# Patient Record
Sex: MALE | Race: WHITE | NOT HISPANIC OR LATINO | Employment: FULL TIME | URBAN - NONMETROPOLITAN AREA
[De-identification: names, ages, dates, MRNs, and addresses within clinical notes are randomized per-mention and may not be internally consistent; named-entity substitution may affect disease eponyms.]

---

## 2021-05-10 ENCOUNTER — HOSPITAL ENCOUNTER (EMERGENCY)
Facility: HOSPITAL | Age: 56
Discharge: HOME | End: 2021-05-10
Attending: EMERGENCY MEDICINE | Admitting: EMERGENCY MEDICINE
Payer: COMMERCIAL

## 2021-05-10 ENCOUNTER — APPOINTMENT (EMERGENCY)
Dept: CT IMAGING | Facility: HOSPITAL | Age: 56
End: 2021-05-10
Payer: COMMERCIAL

## 2021-05-10 ENCOUNTER — TELEPHONE (OUTPATIENT)
Dept: OTHER | Facility: HOSPITAL | Age: 56
End: 2021-05-10

## 2021-05-10 ENCOUNTER — APPOINTMENT (EMERGENCY)
Dept: RADIOLOGY | Facility: HOSPITAL | Age: 56
End: 2021-05-10
Payer: COMMERCIAL

## 2021-05-10 VITALS
SYSTOLIC BLOOD PRESSURE: 153 MMHG | WEIGHT: 199.52 LBS | DIASTOLIC BLOOD PRESSURE: 93 MMHG | OXYGEN SATURATION: 99 % | HEIGHT: 69 IN | TEMPERATURE: 97 F | RESPIRATION RATE: 16 BRPM | BODY MASS INDEX: 29.55 KG/M2 | HEART RATE: 71 BPM

## 2021-05-10 DIAGNOSIS — U07.1 PNEUMONIA DUE TO COVID-19 VIRUS: Primary | ICD-10-CM

## 2021-05-10 DIAGNOSIS — J12.82 PNEUMONIA DUE TO COVID-19 VIRUS: Primary | ICD-10-CM

## 2021-05-10 DIAGNOSIS — N28.89 LEFT RENAL MASS: ICD-10-CM

## 2021-05-10 LAB
ALBUMIN SERPL BCP-MCNC: 3.8 G/DL (ref 3.5–5)
ALP SERPL-CCNC: 50 U/L (ref 46–116)
ALT SERPL W P-5'-P-CCNC: 36 U/L (ref 12–78)
ANION GAP SERPL CALCULATED.3IONS-SCNC: 3 MMOL/L (ref 4–13)
APTT PPP: 28 SECONDS (ref 23–37)
AST SERPL W P-5'-P-CCNC: 27 U/L (ref 5–45)
BACTERIA UR QL AUTO: NORMAL /HPF
BASOPHILS # BLD AUTO: 0.01 THOUSANDS/ΜL (ref 0–0.1)
BASOPHILS NFR BLD AUTO: 0 % (ref 0–1)
BILIRUB SERPL-MCNC: 0.48 MG/DL (ref 0.2–1)
BILIRUB UR QL STRIP: NEGATIVE
BUN SERPL-MCNC: 13 MG/DL (ref 5–25)
CALCIUM SERPL-MCNC: 8.5 MG/DL (ref 8.3–10.1)
CHLORIDE SERPL-SCNC: 104 MMOL/L (ref 100–108)
CLARITY UR: CLEAR
CO2 SERPL-SCNC: 33 MMOL/L (ref 21–32)
COLOR UR: YELLOW
CREAT SERPL-MCNC: 1.18 MG/DL (ref 0.6–1.3)
D DIMER PPP FEU-MCNC: 0.35 UG/ML FEU
EOSINOPHIL # BLD AUTO: 0 THOUSAND/ΜL (ref 0–0.61)
EOSINOPHIL NFR BLD AUTO: 0 % (ref 0–6)
ERYTHROCYTE [DISTWIDTH] IN BLOOD BY AUTOMATED COUNT: 12.5 % (ref 11.6–15.1)
GFR SERPL CREATININE-BSD FRML MDRD: 70 ML/MIN/1.73SQ M
GLUCOSE SERPL-MCNC: 137 MG/DL (ref 65–140)
GLUCOSE UR STRIP-MCNC: NEGATIVE MG/DL
HCT VFR BLD AUTO: 48.2 % (ref 36.5–49.3)
HGB BLD-MCNC: 15.7 G/DL (ref 12–17)
HGB UR QL STRIP.AUTO: ABNORMAL
IMM GRANULOCYTES # BLD AUTO: 0.02 THOUSAND/UL (ref 0–0.2)
IMM GRANULOCYTES NFR BLD AUTO: 1 % (ref 0–2)
INR PPP: 0.93 (ref 0.84–1.19)
KETONES UR STRIP-MCNC: NEGATIVE MG/DL
LACTATE SERPL-SCNC: 1.9 MMOL/L (ref 0.5–2)
LEUKOCYTE ESTERASE UR QL STRIP: NEGATIVE
LIPASE SERPL-CCNC: 191 U/L (ref 73–393)
LYMPHOCYTES # BLD AUTO: 0.64 THOUSANDS/ΜL (ref 0.6–4.47)
LYMPHOCYTES NFR BLD AUTO: 15 % (ref 14–44)
MCH RBC QN AUTO: 28.5 PG (ref 26.8–34.3)
MCHC RBC AUTO-ENTMCNC: 32.6 G/DL (ref 31.4–37.4)
MCV RBC AUTO: 88 FL (ref 82–98)
MONOCYTES # BLD AUTO: 0.37 THOUSAND/ΜL (ref 0.17–1.22)
MONOCYTES NFR BLD AUTO: 9 % (ref 4–12)
NEUTROPHILS # BLD AUTO: 3.32 THOUSANDS/ΜL (ref 1.85–7.62)
NEUTS SEG NFR BLD AUTO: 75 % (ref 43–75)
NITRITE UR QL STRIP: NEGATIVE
NON-SQ EPI CELLS URNS QL MICRO: NORMAL /HPF
NRBC BLD AUTO-RTO: 0 /100 WBCS
PH UR STRIP.AUTO: 5.5 [PH]
PLATELET # BLD AUTO: 136 THOUSANDS/UL (ref 149–390)
PMV BLD AUTO: 10.4 FL (ref 8.9–12.7)
POTASSIUM SERPL-SCNC: 4.5 MMOL/L (ref 3.5–5.3)
PROT SERPL-MCNC: 7.7 G/DL (ref 6.4–8.2)
PROT UR STRIP-MCNC: NEGATIVE MG/DL
PROTHROMBIN TIME: 12.3 SECONDS (ref 11.6–14.5)
RBC # BLD AUTO: 5.51 MILLION/UL (ref 3.88–5.62)
RBC #/AREA URNS AUTO: NORMAL /HPF
SARS-COV-2 RNA RESP QL NAA+PROBE: POSITIVE
SODIUM SERPL-SCNC: 140 MMOL/L (ref 136–145)
SP GR UR STRIP.AUTO: 1.01 (ref 1–1.03)
TROPONIN I SERPL-MCNC: <0.02 NG/ML
UROBILINOGEN UR QL STRIP.AUTO: 0.2 E.U./DL
WBC # BLD AUTO: 4.36 THOUSAND/UL (ref 4.31–10.16)
WBC #/AREA URNS AUTO: NORMAL /HPF

## 2021-05-10 PROCEDURE — 99285 EMERGENCY DEPT VISIT HI MDM: CPT

## 2021-05-10 PROCEDURE — 85379 FIBRIN DEGRADATION QUANT: CPT | Performed by: EMERGENCY MEDICINE

## 2021-05-10 PROCEDURE — U0003 INFECTIOUS AGENT DETECTION BY NUCLEIC ACID (DNA OR RNA); SEVERE ACUTE RESPIRATORY SYNDROME CORONAVIRUS 2 (SARS-COV-2) (CORONAVIRUS DISEASE [COVID-19]), AMPLIFIED PROBE TECHNIQUE, MAKING USE OF HIGH THROUGHPUT TECHNOLOGIES AS DESCRIBED BY CMS-2020-01-R: HCPCS | Performed by: EMERGENCY MEDICINE

## 2021-05-10 PROCEDURE — 84484 ASSAY OF TROPONIN QUANT: CPT | Performed by: EMERGENCY MEDICINE

## 2021-05-10 PROCEDURE — 85025 COMPLETE CBC W/AUTO DIFF WBC: CPT | Performed by: EMERGENCY MEDICINE

## 2021-05-10 PROCEDURE — 83605 ASSAY OF LACTIC ACID: CPT | Performed by: EMERGENCY MEDICINE

## 2021-05-10 PROCEDURE — 83690 ASSAY OF LIPASE: CPT | Performed by: EMERGENCY MEDICINE

## 2021-05-10 PROCEDURE — 85730 THROMBOPLASTIN TIME PARTIAL: CPT | Performed by: EMERGENCY MEDICINE

## 2021-05-10 PROCEDURE — 80053 COMPREHEN METABOLIC PANEL: CPT | Performed by: EMERGENCY MEDICINE

## 2021-05-10 PROCEDURE — 81001 URINALYSIS AUTO W/SCOPE: CPT | Performed by: EMERGENCY MEDICINE

## 2021-05-10 PROCEDURE — 93005 ELECTROCARDIOGRAM TRACING: CPT

## 2021-05-10 PROCEDURE — 96361 HYDRATE IV INFUSION ADD-ON: CPT

## 2021-05-10 PROCEDURE — U0005 INFEC AGEN DETEC AMPLI PROBE: HCPCS | Performed by: EMERGENCY MEDICINE

## 2021-05-10 PROCEDURE — 85610 PROTHROMBIN TIME: CPT | Performed by: EMERGENCY MEDICINE

## 2021-05-10 PROCEDURE — 74177 CT ABD & PELVIS W/CONTRAST: CPT

## 2021-05-10 PROCEDURE — 99285 EMERGENCY DEPT VISIT HI MDM: CPT | Performed by: EMERGENCY MEDICINE

## 2021-05-10 PROCEDURE — G1004 CDSM NDSC: HCPCS

## 2021-05-10 PROCEDURE — 71045 X-RAY EXAM CHEST 1 VIEW: CPT

## 2021-05-10 PROCEDURE — 36415 COLL VENOUS BLD VENIPUNCTURE: CPT | Performed by: EMERGENCY MEDICINE

## 2021-05-10 PROCEDURE — 96374 THER/PROPH/DIAG INJ IV PUSH: CPT

## 2021-05-10 RX ORDER — DOXYCYCLINE HYCLATE 100 MG/1
100 CAPSULE ORAL 2 TIMES DAILY
Qty: 20 CAPSULE | Refills: 0 | Status: SHIPPED | OUTPATIENT
Start: 2021-05-10 | End: 2021-05-20

## 2021-05-10 RX ORDER — DOXYCYCLINE HYCLATE 100 MG/1
100 CAPSULE ORAL ONCE
Status: COMPLETED | OUTPATIENT
Start: 2021-05-10 | End: 2021-05-10

## 2021-05-10 RX ORDER — KETOROLAC TROMETHAMINE 30 MG/ML
15 INJECTION, SOLUTION INTRAMUSCULAR; INTRAVENOUS ONCE
Status: COMPLETED | OUTPATIENT
Start: 2021-05-10 | End: 2021-05-10

## 2021-05-10 RX ADMIN — SODIUM CHLORIDE 1000 ML: 0.9 INJECTION, SOLUTION INTRAVENOUS at 09:04

## 2021-05-10 RX ADMIN — IOHEXOL 100 ML: 350 INJECTION, SOLUTION INTRAVENOUS at 09:30

## 2021-05-10 RX ADMIN — KETOROLAC TROMETHAMINE 15 MG: 30 INJECTION, SOLUTION INTRAMUSCULAR at 09:04

## 2021-05-10 RX ADMIN — DOXYCYCLINE 100 MG: 100 CAPSULE ORAL at 10:42

## 2021-05-10 NOTE — TELEPHONE ENCOUNTER
Hospital followup [facility] new left 3-4cm renal mass  Please arrange office visit with robotic MD only, Star

## 2021-05-10 NOTE — DISCHARGE INSTRUCTIONS
Your CAT scan shows that you have a mass on her left liver  The urology team will contact you to set up an appointment  If you do not hear from within the next few days, please call to set this up  [address]    Your healthcare provider and/or public health staff have evaluated you and have determined that you do not need to remain in the hospital at this time  At this time you can be isolated at home where you will be monitored by staff from your local or state health department  You should carefully follow the prevention and isolation steps below until a healthcare provider or local or state health department says that you can return to your normal activities  Stay home except to get medical care    People who are mildly ill with COVID-19 are able to isolate at home during their illness  You should restrict activities outside your home, except for getting medical care  Do not go to work, school, or public areas  Avoid using public transportation, ride-sharing, or taxis  Separate yourself from other people and animals in your home    People: As much as possible, you should stay in a specific room and away from other people in your home  Also, you should use a separate bathroom, if available  Animals: You should restrict contact with pets and other animals while you are sick with COVID-19, just like you would around other people  Although there have not been reports of pets or other animals becoming sick with COVID-19, it is still recommended that people sick with COVID-19 limit contact with animals until more information is known about the virus  When possible, have another member of your household care for your animals while you are sick  If you are sick with COVID-19, avoid contact with your pet, including petting, snuggling, being kissed or licked, and sharing food   If you must care for your pet or be around animals while you are sick, wash your hands before and after you interact with pets and wear a facemask  See COVID-19 and Animals for more information  Call ahead before visiting your doctor    If you have a medical appointment, call the healthcare provider and tell them that you have or may have COVID-19  This will help the healthcare providers office take steps to keep other people from getting infected or exposed  Wear a facemask    You should wear a facemask when you are around other people (e g , sharing a room or vehicle) or pets and before you enter a healthcare providers office  If you are not able to wear a facemask (for example, because it causes trouble breathing), then people who live with you should not stay in the same room with you, or they should wear a facemask if they enter your room  Cover your coughs and sneezes    Cover your mouth and nose with a tissue when you cough or sneeze  Throw used tissues in a lined trash can  Immediately wash your hands with soap and water for at least 20 seconds or, if soap and water are not available, clean your hands with an alcohol-based hand  that contains at least 60% alcohol  Clean your hands often    Wash your hands often with soap and water for at least 20 seconds, especially after blowing your nose, coughing, or sneezing; going to the bathroom; and before eating or preparing food  If soap and water are not readily available, use an alcohol-based hand  with at least 60% alcohol, covering all surfaces of your hands and rubbing them together until they feel dry  Soap and water are the best option if hands are visibly dirty  Avoid touching your eyes, nose, and mouth with unwashed hands  Avoid sharing personal household items    You should not share dishes, drinking glasses, cups, eating utensils, towels, or bedding with other people or pets in your home  After using these items, they should be washed thoroughly with soap and water      Clean all high-touch surfaces everyday    High touch surfaces include counters, tabletops, doorknobs, bathroom fixtures, toilets, phones, keyboards, tablets, and bedside tables  Also, clean any surfaces that may have blood, stool, or body fluids on them  Use a household cleaning spray or wipe, according to the label instructions  Labels contain instructions for safe and effective use of the cleaning product including precautions you should take when applying the product, such as wearing gloves and making sure you have good ventilation during use of the product  Monitor your symptoms    Seek prompt medical attention if your illness is worsening (e g , difficulty breathing)  Before seeking care, call your healthcare provider and tell them that you have, or are being evaluated for, COVID-19  Put on a facemask before you enter the facility  These steps will help the healthcare providers office to keep other people in the office or waiting room from getting infected or exposed  Ask your healthcare provider to call the local or [facility] health department  Persons who are placed under active monitoring or facilitated self-monitoring should follow instructions provided by their local health department or occupational health professionals, as appropriate  If you have a medical emergency and need to call 911, notify the dispatch personnel that you have, or are being evaluated for COVID-19  If possible, put on a facemask before emergency medical services arrive  Discontinuing home isolation    Patients with confirmed COVID-19 should remain under home isolation precautions until the following conditions are met:    They have had no fever for at least 24 hours (that is one full day of no fever without the use medicine that reduces fevers)  AND  other symptoms have improved (for example, when their cough or shortness of breath have improved)  AND  If had mild or moderate illness, at least 10 days have passed since their symptoms first appeared or if severe illness (needed oxygen) or immunosuppressed, at least 20 days have passed since symptoms first appeared  Patients with confirmed COVID-19 should also notify close contacts (including their workplace) and ask that they self-quarantine  Currently, close contact is defined as being within 6 feet for 15 minutes or more from the period 24 hours starting 48 hours before symptom onset to the time at which the patient went into isolation  Close contacts of patients diagnosed with COVID-19 should be instructed by the patient to self-quarantine for 14 days from the last time of their last contact with the patient       Source: [employer]

## 2021-05-10 NOTE — ED PROVIDER NOTES
History  Chief Complaint   Patient presents with    Abdominal Pain     pt c/o left upper abdominal pain radiating to back and sore throat for past couple days  pt exposed to live-in family member tested covid positive  hx constipation  denies travel/sob/fevers/cough     Patient complains of left upper quadrant pain on off for the past few days  Occasional radiation to his back  No change with breathing  No trauma  No recent cough or cold symptoms  No dysuria  No nausea vomiting or diarrhea  Nothing taken for symptoms  Nothing seems to make it worse  No history of similar episodes  History provided by:  Patient   used: No    Abdominal Pain  Pain location:  LUQ  Pain quality: aching    Pain radiates to:  L flank  Pain severity:  Mild  Onset quality:  Gradual  Duration:  3 days  Timing:  Intermittent  Progression:  Waxing and waning  Chronicity:  New  Context: not recent illness and not sick contacts    Relieved by:  Nothing  Worsened by:  Nothing  Ineffective treatments:  None tried  Associated symptoms: constipation    Associated symptoms: no anorexia, no chest pain, no chills, no cough, no diarrhea, no dysuria, no fatigue, no fever, no hematuria, no nausea, no shortness of breath, no sore throat and no vomiting        None       History reviewed  No pertinent past medical history  History reviewed  No pertinent surgical history  History reviewed  No pertinent family history  I have reviewed and agree with the history as documented  E-Cigarette/Vaping    E-Cigarette Use Never User      E-Cigarette/Vaping Substances     Social History     Tobacco Use    Smoking status: Never Smoker    Smokeless tobacco: Never Used   Substance Use Topics    Alcohol use: Not Currently    Drug use: Never       Review of Systems   Constitutional: Negative for chills, fatigue and fever  HENT: Negative for ear pain, hearing loss, sore throat, trouble swallowing and voice change      Eyes: Negative for pain and discharge  Respiratory: Negative for cough, shortness of breath and wheezing  Cardiovascular: Negative for chest pain and palpitations  Gastrointestinal: Positive for abdominal pain and constipation  Negative for anorexia, blood in stool, diarrhea, nausea and vomiting  Genitourinary: Negative for dysuria, flank pain, frequency and hematuria  Musculoskeletal: Negative for joint swelling, neck pain and neck stiffness  Skin: Negative for rash and wound  Neurological: Negative for dizziness, seizures, syncope, facial asymmetry and headaches  Psychiatric/Behavioral: Negative for hallucinations, self-injury and suicidal ideas  All other systems reviewed and are negative  Physical Exam  Physical Exam  Vitals signs and nursing note reviewed  Constitutional:       General: He is not in acute distress  Appearance: He is well-developed  HENT:      Head: Normocephalic and atraumatic  Right Ear: External ear normal       Left Ear: External ear normal    Eyes:      Conjunctiva/sclera: Conjunctivae normal       Pupils: Pupils are equal, round, and reactive to light  Neck:      Musculoskeletal: Normal range of motion and neck supple  Cardiovascular:      Rate and Rhythm: Normal rate and regular rhythm  Heart sounds: Normal heart sounds  No murmur  Pulmonary:      Effort: Pulmonary effort is normal       Breath sounds: Normal breath sounds  No wheezing or rales  Abdominal:      General: Bowel sounds are normal  There is no distension  Palpations: Abdomen is soft  Tenderness: There is abdominal tenderness in the left lower quadrant  There is no guarding or rebound  Musculoskeletal: Normal range of motion  General: No deformity  Skin:     General: Skin is warm and dry  Findings: No rash  Neurological:      General: No focal deficit present  Mental Status: He is alert and oriented to person, place, and time  Cranial Nerves:  No cranial nerve deficit     Psychiatric:         Behavior: Behavior normal          Vital Signs  ED Triage Vitals [05/10/21 0849]   Temperature Pulse Respirations Blood Pressure SpO2   (!) 97 °F (36 1 °C) 89 17 166/89 97 %      Temp Source Heart Rate Source Patient Position - Orthostatic VS BP Location FiO2 (%)   Temporal Monitor Lying Right arm --      Pain Score       3           Vitals:    05/10/21 0849 05/10/21 1015 05/10/21 1030   BP: 166/89  153/93   Pulse: 89 72 71   Patient Position - Orthostatic VS: Lying           Visual Acuity      ED Medications  Medications   sodium chloride 0 9 % bolus 1,000 mL (0 mL Intravenous Stopped 5/10/21 1039)   ketorolac (TORADOL) injection 15 mg (15 mg Intravenous Given 5/10/21 0904)   iohexol (OMNIPAQUE) 350 MG/ML injection (SINGLE-DOSE) 100 mL (100 mL Intravenous Given 5/10/21 0930)   doxycycline hyclate (VIBRAMYCIN) capsule 100 mg (100 mg Oral Given 5/10/21 1042)       Diagnostic Studies  Results Reviewed     Procedure Component Value Units Date/Time    Urine Microscopic [[id number]]  (Normal) Collected: 05/10/21 1028    Lab Status: Final result Specimen: Urine, Clean Catch Updated: 05/10/21 1054     RBC, UA 1-2 /hpf      WBC, UA None Seen /hpf      Epithelial Cells Occasional /hpf      Bacteria, UA None Seen /hpf     UA w Reflex to Microscopic w Reflex to Culture [[id number]]  (Abnormal) Collected: 05/10/21 1028    Lab Status: Final result Specimen: Urine, Clean Catch Updated: 05/10/21 1035     Color, UA Yellow     Clarity, UA Clear     Specific Gravity, UA 1 010     pH, UA 5 5     Leukocytes, UA Negative     Nitrite, UA Negative     Protein, UA Negative mg/dl      Glucose, UA Negative mg/dl      Ketones, UA Negative mg/dl      Urobilinogen, UA 0 2 E U /dl      Bilirubin, UA Negative     Blood, UA Trace-lysed    Novel Coronavirus (Covid-19),PCR [facility] - 2 Hour Stat [[id number]]  (Abnormal) Collected: 05/10/21 0910    Lab Status: Final result Specimen: Nares from Nasopharyngeal Swab Updated: 05/10/21 0957     SARS-CoV-2 Positive    Narrative: The specimen collection materials, transport medium, and/or testing methodology utilized in the production of these test results have been proven to be reliable in a limited validation with an abbreviated program under the Emergency Utilization Authorization provided by the FDA  Testing reported as "Presumptive positive" will be confirmed with secondary testing to ensure result accuracy  Clinical caution and judgement should be used with the interpretation of these results with consideration of the clinical impression and other laboratory testing  Testing reported as "Positive" or "Negative" has been proven to be accurate according to standard laboratory validation requirements  All testing is performed with control materials showing appropriate reactivity at standard intervals  Lactic acid [[id number]]  (Normal) Collected: 05/10/21 0859    Lab Status: Final result Specimen: Blood from Arm, Left Updated: 05/10/21 0925     LACTIC ACID 1 9 mmol/L     Narrative:      Result may be elevated if tourniquet was used during collection      Troponin I [[id number]]  (Normal) Collected: 05/10/21 0859    Lab Status: Final result Specimen: Blood from Arm, Left Updated: 05/10/21 0925     Troponin I <0 02 ng/mL     D-Dimer [[id number]]  (Normal) Collected: 05/10/21 0859    Lab Status: Final result Specimen: Blood from Arm, Left Updated: 05/10/21 0920     D-Dimer, Quant 0 35 ug/ml FEU     Comprehensive metabolic panel [[id number]]  (Abnormal) Collected: 05/10/21 0859    Lab Status: Final result Specimen: Blood from Arm, Left Updated: 05/10/21 0920     Sodium 140 mmol/L      Potassium 4 5 mmol/L      Chloride 104 mmol/L      CO2 33 mmol/L      ANION GAP 3 mmol/L      BUN 13 mg/dL      Creatinine 1 18 mg/dL      Glucose 137 mg/dL      Calcium 8 5 mg/dL      AST 27 U/L      ALT 36 U/L      Alkaline Phosphatase 50 U/L      Total Protein 7 7 g/dL      Albumin 3 8 g/dL      Total Bilirubin 0 48 mg/dL      eGFR 70 ml/min/1 73sq m     Narrative:      National Kidney Disease Foundation guidelines for Chronic Kidney Disease (CKD):     Stage 1 with normal or high GFR (GFR > 90 mL/min/1 73 square meters)    Stage 2 Mild CKD (GFR = 60-89 mL/min/1 73 square meters)    Stage 3A Moderate CKD (GFR = 45-59 mL/min/1 73 square meters)    Stage 3B Moderate CKD (GFR = 30-44 mL/min/1 73 square meters)    Stage 4 Severe CKD (GFR = 15-29 mL/min/1 73 square meters)    Stage 5 End Stage CKD (GFR <15 mL/min/1 73 square meters)  Note: GFR calculation is accurate only with a steady state creatinine    Lipase [[id number]]  (Normal) Collected: 05/10/21 0859    Lab Status: Final result Specimen: Blood from Arm, Left Updated: 05/10/21 0920     Lipase 191 u/L     Protime-INR [[id number]]  (Normal) Collected: 05/10/21 0859    Lab Status: Final result Specimen: Blood from Arm, Left Updated: 05/10/21 0918     Protime 12 3 seconds      INR 0 93    APTT [[id number]]  (Normal) Collected: 05/10/21 0859    Lab Status: Final result Specimen: Blood from Arm, Left Updated: 05/10/21 0918     PTT 28 seconds     CBC and differential [[id number]]  (Abnormal) Collected: 05/10/21 0859    Lab Status: Final result Specimen: Blood from Arm, Left Updated: 05/10/21 0914     WBC 4 36 Thousand/uL      RBC 5 51 Million/uL      Hemoglobin 15 7 g/dL      Hematocrit 48 2 %      MCV 88 fL      MCH 28 5 pg      MCHC 32 6 g/dL      RDW 12 5 %      MPV 10 4 fL      Platelets 221 Thousands/uL      nRBC 0 /100 WBCs      Neutrophils Relative 75 %      Immat GRANS % 1 %      Lymphocytes Relative 15 %      Monocytes Relative 9 %      Eosinophils Relative 0 %      Basophils Relative 0 %      Neutrophils Absolute 3 32 Thousands/µL      Immature Grans Absolute 0 02 Thousand/uL      Lymphocytes Absolute 0 64 Thousands/µL      Monocytes Absolute 0 37 Thousand/µL      Eosinophils Absolute 0 00 Thousand/µL      Basophils Absolute 0 01 Thousands/µL                  CT abdomen pelvis with contrast   Final Result by Gabby Peoples MD (05/10 1001)      No acute inflammatory stranding   Diverticulosis   3 6 x 3 7 x 2 4 cm solid renal mass upper pole left kidney, likely neoplasm   No retroperitoneal lymph node enlargement seen   No renal vein invasion   Urology evaluation suggested   Nodular areas of groundglass density seen at the left lung base, right lung base typical for Covid associated infiltrate  Correlate clinically to exclude other etiologies      Follow-up chest CT at 3 months to demonstrate resolution          I personally discussed this study with JH Harris on 5/10/2021 at 9:57 AM                       Workstation performed: [id number]         XR chest 1 view portable   Final Result by Oscar Simental MD (05/10 9971)      No acute cardiopulmonary disease  Workstation performed: [id number]                    Procedures  ECG 12 Lead Documentation Only    Date/Time: 5/10/2021 8:51 AM  Performed by: Marina Sloan MD  Authorized by: Marina Sloan MD     ECG reviewed by me, the ED Provider: yes    Patient location:  ED  Previous ECG:     Previous ECG:  Unavailable  Rate:     ECG rate:  70  Rhythm:     Rhythm: sinus rhythm    QRS:     QRS axis:  Normal             ED Course  ED Course as of May 10 1205   Mon May 10, 2021   1025 Discussed with Urology PA, Liz Ashby  Will set up outpatient follow-up with the robotic urologist, Dr Cheryle Avery   I discussed with the patient who understands and is agreement with the plan of care  1204 Patient called  Patient wanted clarification on his discharge instructions  His discharge directions say he had a mass on his left liver    Patient was reassured that it is his left kidney and that he was referred to the correct specialist                                 SBIRT 20yo+      Most Recent Value   SBIRT (24 yo +)   In order to provide better care to our patients, we are screening all of our patients for alcohol and drug use  Would it be okay to ask you these screening questions? Yes Filed at: 05/10/2021 7843   Initial Alcohol Screen: US AUDIT-C    1  How often do you have a drink containing alcohol?  0 Filed at: 05/10/2021 0911   2  How many drinks containing alcohol do you have on a typical day you are drinking? 0 Filed at: 05/10/2021 0911   3a  Male UNDER 65: How often do you have five or more drinks on one occasion? 0 Filed at: 05/10/2021 0911   Audit-C Score  0 Filed at: 05/10/2021 1740   TIFFANY: How many times in the past year have you    Used an illegal drug or used a prescription medication for non-medical reasons? Never Filed at: 05/10/2021 0911                    [facility]  Number of Diagnoses or Management Options     Amount and/or Complexity of Data Reviewed  Clinical lab tests: reviewed  Review and summarize past medical records: yes        Disposition  Final diagnoses:   Pneumonia due to COVID-19 virus   Left renal mass     Time reflects when diagnosis was documented in both MDM as applicable and the Disposition within this note     Time User Action Codes Description Comment    5/10/2021 10:02 AM Chito Muniz Add [U07 1,  J12 82] Pneumonia due to COVID-19 virus     5/10/2021 10:05 AM Sonny Lombard Add [N28 89] Left renal mass       ED Disposition     ED Disposition Condition Date/Time Comment    Discharge Stable Mon May 10, 2021 10:27 AM Larry Aguayo  discharge to home/self care              Follow-up Information     Follow up With Specialties Details Why Contact Info Additional [address] For Urology [facility] Urology Schedule an appointment as soon as possible for a visit in 3 week(s) urology office will call you to arrange visit to discuss CT findings of a mass on your kidney [address]  [place] [facility] [address]  701  [facility] Urology [facility], [address], [place], [place], [address]    [phone]    Scotty Garcia MD Urology Schedule an appointment as soon as possible for a visit in 21 days They should be calling you to schedule this appointment  [address] [facility]  [address] [place] [address]             Discharge Medication List as of 5/10/2021 10:29 AM      START taking these medications    Details   doxycycline hyclate (VIBRAMYCIN) 100 mg capsule Take 1 capsule (100 mg total) by mouth 2 (two) times a day for 10 days, Starting Mon 5/10/2021, Until Thu 5/20/2021, Normal           No discharge procedures on file      PDMP Review     None          ED Provider  Electronically Signed by           Alida Favre, MD  05/10/21 [place] Lay Campos MD  05/10/21 0535

## 2021-05-15 LAB
ATRIAL RATE: 73 BPM
P AXIS: 58 DEGREES
PR INTERVAL: 166 MS
QRS AXIS: 74 DEGREES
QRSD INTERVAL: 96 MS
QT INTERVAL: 396 MS
QTC INTERVAL: 436 MS
T WAVE AXIS: 69 DEGREES
VENTRICULAR RATE: 73 BPM

## 2021-05-15 PROCEDURE — 93010 ELECTROCARDIOGRAM REPORT: CPT | Performed by: INTERNAL MEDICINE

## 2021-06-01 ENCOUNTER — OFFICE VISIT (OUTPATIENT)
Dept: UROLOGY | Facility: CLINIC | Age: 56
End: 2021-06-01
Payer: COMMERCIAL

## 2021-06-01 VITALS
HEIGHT: 69 IN | BODY MASS INDEX: 30.51 KG/M2 | SYSTOLIC BLOOD PRESSURE: 124 MMHG | WEIGHT: 206 LBS | DIASTOLIC BLOOD PRESSURE: 72 MMHG

## 2021-06-01 DIAGNOSIS — M89.9 LESION OF BONE OF THORACIC SPINE: ICD-10-CM

## 2021-06-01 DIAGNOSIS — N28.89 LEFT RENAL MASS: Primary | ICD-10-CM

## 2021-06-01 DIAGNOSIS — E11.9 TYPE 2 DIABETES MELLITUS WITHOUT COMPLICATION, WITHOUT LONG-TERM CURRENT USE OF INSULIN (HCC): ICD-10-CM

## 2021-06-01 PROCEDURE — 99205 OFFICE O/P NEW HI 60 MIN: CPT | Performed by: UROLOGY

## 2021-06-01 RX ORDER — CEFAZOLIN SODIUM 2 G/50ML
2000 SOLUTION INTRAVENOUS ONCE
Status: CANCELLED | OUTPATIENT
Start: 2021-08-23 | End: 2021-06-01

## 2021-06-01 NOTE — PROGRESS NOTES
UROLOGY NEW ER FOLLOW UP NOTE     CHIEF COMPLAINT   Bentley Riedel  is a 48 y o  male with a complaint of No chief complaint on file  History of Present Illness:     48 y o  male  Seen in the emergency room and diagnosed with COVID-19 in early May  Patient has had multiple family members who have had the disease and 2 more remote family members who have passed away  He is out of his core team period and only having some mild symptoms  Patient has right and left-sided back pain  He works in the [employer]  He is a nonsmoker and has no family history of kidney cancer  Incidental left upper pole renal mass was noted at the time of his ER imaging  Presents today as a referral for discussion  Past Medical History:   No past medical history on file  PAST SURGICAL HISTORY:   No past surgical history on file  CURRENT MEDICATIONS:     No current outpatient medications on file  No current facility-administered medications for this visit          ALLERGIES:   No Known Allergies    SOCIAL HISTORY:     Social History     Socioeconomic History    Marital status: /Civil Union     Spouse name: Not on file    Number of children: Not on file    Years of education: Not on file    Highest education level: Not on file   Occupational History    Not on file   Social Needs    Financial resource strain: Not on file    Food insecurity     Worry: Not on file     Inability: Not on file   [employer] needs     Medical: Not on file     Non-medical: Not on file   Tobacco Use    Smoking status: Never Smoker    Smokeless tobacco: Never Used   Substance and Sexual Activity    Alcohol use: Not Currently    Drug use: Never    Sexual activity: Not on file   Lifestyle    Physical activity     Days per week: Not on file     Minutes per session: Not on file    Stress: Not on file   Relationships    Social connections     Talks on phone: Not on file     Gets together: Not on file     Attends [employer] service: Not on file     Active member of club or organization: Not on file     Attends meetings of clubs or organizations: Not on file     Relationship status: Not on file    Intimate partner violence     Fear of current or ex partner: Not on file     Emotionally abused: Not on file     Physically abused: Not on file     Forced sexual activity: Not on file   Other Topics Concern    Not on file   Social History Narrative    Not on file       SOCIAL HISTORY:   No family history on file  REVIEW OF SYSTEMS:     Review of Systems   Constitutional: Negative  Respiratory: Positive for cough and shortness of breath  Cardiovascular: Negative  Gastrointestinal: Negative  Genitourinary: Negative  Musculoskeletal: Positive for back pain and myalgias  Skin: Negative  Psychiatric/Behavioral: Negative  PHYSICAL EXAM:     There were no vitals taken for this visit  General:  Healthy appearing male in no acute distress  They have a normal affect  There is not appear to be any gross neurologic defects or abnormalities  HEENT:  Normocephalic, atraumatic  Neck is supple without any palpable lymphadenopathy  Cardiovascular:  Patient has normal palpable distal radial pulses  There is no significant peripheral edema  No JVD is noted  Respiratory:  Patient has unlabored respirations  There is no audible wheeze or rhonchi  Abdomen:  Abdomen is soft and nontender  There is no tympany  Inguinal and umbilical hernia are not appreciated  Musculoskeletal:  Patient does not have significant CVA tenderness in the  flank with palpation or percussion  They full range of motion in all 4 extremities  Strength in all 4 extremities appears congruent  Patient is able to ambulate without assistance or difficulty  Dermatologic:  Patient has no skin abnormalities or rashes        LABS:     CBC:   Lab Results   Component Value Date    WBC 4 36 05/10/2021    HGB 15 7 05/10/2021    HCT 48 2 05/10/2021    MCV 88 05/10/2021     (L) 05/10/2021       BMP:   Lab Results   Component Value Date    CALCIUM 8 5 05/10/2021    K 4 5 05/10/2021    CO2 33 (H) 05/10/2021     05/10/2021    BUN 13 05/10/2021    CREATININE 1 18 05/10/2021       IMAGIN/10/21  CT ABDOMEN AND PELVIS WITH IV CONTRAST     INDICATION:   Abdominal pain, acute, nonlocalized  Left-sided abdominal pain      COMPARISON:  None      TECHNIQUE:  CT examination of the abdomen and pelvis was performed  Axial, sagittal, and coronal 2D reformatted images were created from the source data and submitted for interpretation      Radiation dose length product (DLP) for this visit:  1004 mGy-cm   This examination, like all CT scans performed in the [facility], was performed utilizing techniques to minimize radiation dose exposure, including the use of iterative   reconstruction and automated exposure control      IV Contrast:  100 mL of iohexol (OMNIPAQUE)  Enteric Contrast:  Enteric contrast was not administered      FINDINGS:     ABDOMEN     LOWER CHEST: Small groundglass nodular densities are seen at the left lung base and right lung base LIVER/BILIARY TREE:  Unremarkable      GALLBLADDER:  No calcified gallstones  No pericholecystic inflammatory change      SPLEEN:  Unremarkable      PANCREAS:  Unremarkable      ADRENAL GLANDS:  Unremarkable      KIDNEYS/URETERS:  There is a solid mass in the upper pole of the left kidney which measures 3 6 x 3 7 x 3 4 cm  This mass is more than 50% to exophytic  This mass doesn't cross the polar line, broaches the renal hilum  Patent left renal vein, IVC  STOMACH AND BOWEL:  Diverticulosis seen, without evidence of diverticulitis     APPENDIX:  No findings to suggest appendicitis      ABDOMINOPELVIC CAVITY:  No ascites  No pneumoperitoneum    No lymphadenopathy      VESSELS:  Celiac trunk, SMA are patent     PELVIS     REPRODUCTIVE ORGANS:  Unremarkable for patient's age      URINARY BLADDER:  Unremarkable      ABDOMINAL WALL/INGUINAL REGIONS:  Unremarkable      OSSEOUS STRUCTURES:  No gross lytic lesion seen  Chronic Schmorl's nodes seen along the inferior aspect of the L4 vertebra     IMPRESSION:     No acute inflammatory stranding  Diverticulosis  3 6 x 3 7 x 2 4 cm solid renal mass upper pole left kidney, likely neoplasm  No retroperitoneal lymph node enlargement seen  No renal vein invasion  Urology evaluation suggested  Nodular areas of groundglass density seen at the left lung base, right lung base typical for Covid associated infiltrate  Correlate clinically to exclude other etiologies     Follow-up chest CT at 3 months to demonstrate resolution        ASSESSMENT:     48 y o  male with COVID-19 positivity and 3 7cm LEFT upper pole renal mass    PLAN:       I had a lengthy discussion with the patient about the findings of the renal mass on their CT  Based on imaging, this lesion is a  Left upper pole 3 7 cm solid mass  We discussed the etiology and natural history of both cystic and solid renal lesions  Cystic renal lesions are graded on the Bosniak criteria which help to determine the risk of malignancy  Solid renal masses potentially represent cancers in approximately 4 out of 5 cases  We discussed further diagnostic manipulations including renal mass biopsy  This option and is becoming more favorable in patients who cannot undergo surgery or have significant risk  Biopsy of the lesion does carry its own inherent risks and we discussed the possibility of bleeding, the risk of tumor seeding which has been mostly debulked and recent studies, and the risk of non-diagnosis requiring repeat biopsy  We then discussed treatment options for renal masses   These include active surveillance, local thermal therapies such as cryoablation which can be used in clinical T1a lesions, and surgical removal  Surgical removal can be performed via total radical nephrectomy or partial nephrectomy  We discussed that the goals of surgical therapy include complete removal and long-term durable outcomes from a cancer perspective  Secondary goals include preservation of viable renal tissue  We discussed the absolute and relative indications for nephron sparing surgery  Tertiary goals include performance of the surgery in a minimally invasive fashion  We then briefly discussed the inherent risks of kidney surgery which include bleeding including significant blood loss requiring transfusion, infection, damage to nearby structures, need for future procedures, cancer recurrence  With partial nephrectomy, additional risks include urinary leak  We discussed the risk of transient or permanent any dysfunction in some cases requiring dialysis  I've given the patient resources to further educate themselves about her condition  I've answered all questions and concerns  I 1st recommended that we start with directed imaging to the kidney with a CT abdomen with without contrast   Given the characteristics of the film and the neovascularity that I see, I think this is most likely to be renal malignancy  I described an 80% likelihood to the patient and his wife  I have tentatively recommended that we consider moving forward with surgical scheduling  Given the patient's recent COVID diagnosis, I would not operate any sooner than 3 months from his diagnosis which would be early to mid August   He would need medical clearance prior to any surgical intervention to ensure that his COVID symptoms have resolved and he is optimized  I will tentatively book this procedure  We discussed at length the robotic assisted laparoscopic left partial nephrectomy with the possibility for open surgery and possibility for radical nephrectomy  Goals of surgery were discussed    Risks and benefits including bleeding, blood transfusion, infection, damage to surrounding structures, urinary leak, pseudoaneurysm and AV fistula, benign diagnosis or progression of cancer diagnosis, acute or chronic renal failure were all discussed and described  Patient has agreed to proceed and has signed informed consent    We will discuss the final renal protocol imaging once complete and await scheduling and medical clearance in/ around August

## 2021-06-03 ENCOUNTER — APPOINTMENT (OUTPATIENT)
Dept: LAB | Facility: HOSPITAL | Age: 56
End: 2021-06-03
Attending: UROLOGY
Payer: COMMERCIAL

## 2021-06-03 DIAGNOSIS — N28.89 LEFT RENAL MASS: ICD-10-CM

## 2021-06-03 LAB
ANION GAP SERPL CALCULATED.3IONS-SCNC: 6 MMOL/L (ref 4–13)
BUN SERPL-MCNC: 16 MG/DL (ref 5–25)
CALCIUM SERPL-MCNC: 9.2 MG/DL (ref 8.3–10.1)
CHLORIDE SERPL-SCNC: 105 MMOL/L (ref 100–108)
CO2 SERPL-SCNC: 30 MMOL/L (ref 21–32)
CREAT SERPL-MCNC: 1.04 MG/DL (ref 0.6–1.3)
GFR SERPL CREATININE-BSD FRML MDRD: 81 ML/MIN/1.73SQ M
GLUCOSE P FAST SERPL-MCNC: 99 MG/DL (ref 65–99)
POTASSIUM SERPL-SCNC: 4.3 MMOL/L (ref 3.5–5.3)
SODIUM SERPL-SCNC: 141 MMOL/L (ref 136–145)

## 2021-06-03 PROCEDURE — 36415 COLL VENOUS BLD VENIPUNCTURE: CPT

## 2021-06-03 PROCEDURE — 80048 BASIC METABOLIC PNL TOTAL CA: CPT

## 2021-06-08 ENCOUNTER — HOSPITAL ENCOUNTER (OUTPATIENT)
Dept: CT IMAGING | Facility: HOSPITAL | Age: 56
Discharge: HOME | End: 2021-06-08
Attending: UROLOGY
Payer: COMMERCIAL

## 2021-06-08 DIAGNOSIS — N28.89 LEFT RENAL MASS: ICD-10-CM

## 2021-06-08 PROCEDURE — 74170 CT ABD WO CNTRST FLWD CNTRST: CPT

## 2021-06-08 RX ADMIN — IOHEXOL 85 ML: 350 INJECTION, SOLUTION INTRAVENOUS at 10:57

## 2021-06-09 ENCOUNTER — TELEPHONE (OUTPATIENT)
Dept: UROLOGY | Facility: MEDICAL CENTER | Age: 56
End: 2021-06-09

## 2021-06-10 NOTE — TELEPHONE ENCOUNTER
I spoke to patients wife and scheduled surgery with Dr Yoly Shin on 8/23/21 at [address]  Patient will have pats prior to surgery, covid test, blood work, urine culture  Patients wife is going to schedule medical clearance because of the husbands work schedule  She will call me back once she has that appointment  I am mailing patient a surgery packet

## 2021-06-11 ENCOUNTER — TELEPHONE (OUTPATIENT)
Dept: UROLOGY | Facility: AMBULATORY SURGERY CENTER | Age: 56
End: 2021-06-11

## 2021-06-11 NOTE — TELEPHONE ENCOUNTER
Patient managed by Dr [facility]) Adrian Gonsalves from radiology called in to report significant CT findings   Adrian Gonsalves can be reached at [phone] option 3

## 2021-07-13 ENCOUNTER — TELEPHONE (OUTPATIENT)
Dept: UROLOGY | Facility: AMBULATORY SURGERY CENTER | Age: 56
End: 2021-07-13

## 2021-07-13 NOTE — TELEPHONE ENCOUNTER
Riley Andrea  This patients [place] bone scan is denied with the insurance  I scheduled a peer to peer to be done tomorrow 7/14 at 1:45pm  Please let me know the outcome   LVM for patient to let him know this is canceled for tomorrow until we get approval   Thanks  Gurmeet Jimenez

## 2021-07-23 ENCOUNTER — HOSPITAL ENCOUNTER (OUTPATIENT)
Dept: NUCLEAR MEDICINE | Facility: HOSPITAL | Age: 56
Discharge: HOME | End: 2021-07-23
Attending: UROLOGY
Payer: COMMERCIAL

## 2021-07-23 DIAGNOSIS — M89.9 LESION OF BONE OF THORACIC SPINE: ICD-10-CM

## 2021-07-23 DIAGNOSIS — N28.89 LEFT RENAL MASS: ICD-10-CM

## 2021-07-23 PROCEDURE — G1004 CDSM NDSC: HCPCS

## 2021-07-23 PROCEDURE — A9503 TC99M MEDRONATE: HCPCS

## 2021-07-23 PROCEDURE — 78306 BONE IMAGING WHOLE BODY: CPT

## 2021-07-26 ENCOUNTER — TELEPHONE (OUTPATIENT)
Dept: UROLOGY | Facility: CLINIC | Age: 56
End: 2021-07-26

## 2021-07-26 NOTE — TELEPHONE ENCOUNTER
----- Message from Leland Strong MD sent at 7/26/2021 11:13 AM EDT -----  Please let patient know the bone scan is negative  Proceed as planned

## 2021-07-26 NOTE — TELEPHONE ENCOUNTER
Called [phone] and left a message for patient to please contact the office to speak with clinical about his bone scan results

## 2021-07-27 NOTE — TELEPHONE ENCOUNTER
Pt returning missed call from clinical,please call any time 7/28 as he's at work today and not able to receive calls

## 2021-07-30 ENCOUNTER — TELEPHONE (OUTPATIENT)
Dept: UROLOGY | Facility: MEDICAL CENTER | Age: 56
End: 2021-07-30

## 2021-08-11 ENCOUNTER — LAB (OUTPATIENT)
Dept: LAB | Facility: HOSPITAL | Age: 56
End: 2021-08-11
Attending: UROLOGY
Payer: COMMERCIAL

## 2021-08-11 ENCOUNTER — APPOINTMENT (OUTPATIENT)
Dept: LAB | Facility: HOSPITAL | Age: 56
End: 2021-08-11
Attending: UROLOGY
Payer: COMMERCIAL

## 2021-08-11 DIAGNOSIS — N28.89 LEFT RENAL MASS: ICD-10-CM

## 2021-08-11 DIAGNOSIS — N28.89 LEFT RENAL MASS: Primary | ICD-10-CM

## 2021-08-11 LAB
ABO GROUP BLD: NORMAL
ALBUMIN SERPL BCP-MCNC: 4.1 G/DL (ref 3.5–5)
ALP SERPL-CCNC: 40 U/L (ref 46–116)
ALT SERPL W P-5'-P-CCNC: 33 U/L (ref 12–78)
ANION GAP SERPL CALCULATED.3IONS-SCNC: 9 MMOL/L (ref 4–13)
APTT PPP: 27 SECONDS (ref 23–37)
AST SERPL W P-5'-P-CCNC: 24 U/L (ref 5–45)
ATRIAL RATE: 64 BPM
BASOPHILS # BLD AUTO: 0.02 THOUSANDS/ΜL (ref 0–0.1)
BASOPHILS NFR BLD AUTO: 0 % (ref 0–1)
BILIRUB SERPL-MCNC: 0.83 MG/DL (ref 0.2–1)
BLD GP AB SCN SERPL QL: NEGATIVE
BUN SERPL-MCNC: 22 MG/DL (ref 5–25)
CALCIUM SERPL-MCNC: 8.7 MG/DL (ref 8.3–10.1)
CHLORIDE SERPL-SCNC: 106 MMOL/L (ref 100–108)
CO2 SERPL-SCNC: 30 MMOL/L (ref 21–32)
CREAT SERPL-MCNC: 0.99 MG/DL (ref 0.6–1.3)
EOSINOPHIL # BLD AUTO: 0.03 THOUSAND/ΜL (ref 0–0.61)
EOSINOPHIL NFR BLD AUTO: 1 % (ref 0–6)
ERYTHROCYTE [DISTWIDTH] IN BLOOD BY AUTOMATED COUNT: 13.7 % (ref 11.6–15.1)
EST. AVERAGE GLUCOSE BLD GHB EST-MCNC: 123 MG/DL
GFR SERPL CREATININE-BSD FRML MDRD: 86 ML/MIN/1.73SQ M
GLUCOSE P FAST SERPL-MCNC: 100 MG/DL (ref 65–99)
HBA1C MFR BLD: 5.9 %
HCT VFR BLD AUTO: 48.9 % (ref 36.5–49.3)
HGB BLD-MCNC: 15.9 G/DL (ref 12–17)
IMM GRANULOCYTES # BLD AUTO: 0.04 THOUSAND/UL (ref 0–0.2)
IMM GRANULOCYTES NFR BLD AUTO: 1 % (ref 0–2)
INR PPP: 0.93 (ref 0.84–1.19)
LYMPHOCYTES # BLD AUTO: 1.08 THOUSANDS/ΜL (ref 0.6–4.47)
LYMPHOCYTES NFR BLD AUTO: 23 % (ref 14–44)
MCH RBC QN AUTO: 29.3 PG (ref 26.8–34.3)
MCHC RBC AUTO-ENTMCNC: 32.5 G/DL (ref 31.4–37.4)
MCV RBC AUTO: 90 FL (ref 82–98)
MONOCYTES # BLD AUTO: 0.4 THOUSAND/ΜL (ref 0.17–1.22)
MONOCYTES NFR BLD AUTO: 9 % (ref 4–12)
NEUTROPHILS # BLD AUTO: 3.13 THOUSANDS/ΜL (ref 1.85–7.62)
NEUTS SEG NFR BLD AUTO: 66 % (ref 43–75)
NRBC BLD AUTO-RTO: 0 /100 WBCS
P AXIS: 45 DEGREES
PLATELET # BLD AUTO: 162 THOUSANDS/UL (ref 149–390)
PMV BLD AUTO: 11 FL (ref 8.9–12.7)
POTASSIUM SERPL-SCNC: 4.4 MMOL/L (ref 3.5–5.3)
PR INTERVAL: 178 MS
PROT SERPL-MCNC: 7.6 G/DL (ref 6.4–8.2)
PROTHROMBIN TIME: 12.2 SECONDS (ref 11.6–14.5)
QRS AXIS: 54 DEGREES
QRSD INTERVAL: 96 MS
QT INTERVAL: 418 MS
QTC INTERVAL: 431 MS
RBC # BLD AUTO: 5.42 MILLION/UL (ref 3.88–5.62)
RH BLD: POSITIVE
SODIUM SERPL-SCNC: 145 MMOL/L (ref 136–145)
SPECIMEN EXPIRATION DATE: NORMAL
T WAVE AXIS: 51 DEGREES
VENTRICULAR RATE: 64 BPM
WBC # BLD AUTO: 4.7 THOUSAND/UL (ref 4.31–10.16)

## 2021-08-11 PROCEDURE — 86901 BLOOD TYPING SEROLOGIC RH(D): CPT

## 2021-08-11 PROCEDURE — 85730 THROMBOPLASTIN TIME PARTIAL: CPT

## 2021-08-11 PROCEDURE — 85025 COMPLETE CBC W/AUTO DIFF WBC: CPT

## 2021-08-11 PROCEDURE — 87086 URINE CULTURE/COLONY COUNT: CPT

## 2021-08-11 PROCEDURE — 93005 ELECTROCARDIOGRAM TRACING: CPT

## 2021-08-11 PROCEDURE — 86850 RBC ANTIBODY SCREEN: CPT

## 2021-08-11 PROCEDURE — 93010 ELECTROCARDIOGRAM REPORT: CPT | Performed by: INTERNAL MEDICINE

## 2021-08-11 PROCEDURE — 85610 PROTHROMBIN TIME: CPT

## 2021-08-11 PROCEDURE — 36415 COLL VENOUS BLD VENIPUNCTURE: CPT

## 2021-08-11 PROCEDURE — 80053 COMPREHEN METABOLIC PANEL: CPT

## 2021-08-11 PROCEDURE — 83036 HEMOGLOBIN GLYCOSYLATED A1C: CPT

## 2021-08-11 PROCEDURE — 86900 BLOOD TYPING SEROLOGIC ABO: CPT

## 2021-08-12 LAB — BACTERIA UR CULT: NORMAL

## 2021-08-17 NOTE — PRE-PROCEDURE INSTRUCTIONS
No outpatient medications have been marked as taking for the 8/23/21 encounter [facility] Encounter)  INSTR  [facility] ID/MED LIST/INS  INFO , SHOWER REV , NO ASA/NSAIDS/VIT 1 WEEK PREOP  Pre Procedure Consult Calls    1  Are you currently experiencing symptoms of fever >100 4, cough, or shortness of breath or sore throat? ______YES    ___X__NO   If yes, then please call your PCP immediately for further direction  If you are completing this form on site, please find the safest and most direct route to the nearest exit and avoid close contact with others until you can get further advice from your PCP  2  Have you recently traveled to any foreign country or area within the [place] that has reported cases of COVID-19? ______YES      __X___NO   IF YES, LIST LOCATION(S): __________________________   3  Have you recently been in contact with someone who is a suspected or confirmed case of COVID-19?   ______ YES   ____X__ No   INSTRUCTED ON NEW COVID VISITOR POLICY- ONLY 1 VISITOR, ALL MUST WEAR MASKS, PT VERBALIZES UNDERSTANDING

## 2021-08-20 ENCOUNTER — ANESTHESIA EVENT (OUTPATIENT)
Dept: PERIOP | Facility: HOSPITAL | Age: 56
DRG: 661 | End: 2021-08-20
Payer: COMMERCIAL

## 2021-08-23 ENCOUNTER — HOSPITAL ENCOUNTER (INPATIENT)
Facility: HOSPITAL | Age: 56
LOS: 1 days | Discharge: HOME | DRG: 661 | End: 2021-08-24
Attending: UROLOGY | Admitting: UROLOGY
Payer: COMMERCIAL

## 2021-08-23 ENCOUNTER — ANESTHESIA (OUTPATIENT)
Dept: PERIOP | Facility: HOSPITAL | Age: 56
DRG: 661 | End: 2021-08-23
Payer: COMMERCIAL

## 2021-08-23 DIAGNOSIS — N28.89 LEFT RENAL MASS: ICD-10-CM

## 2021-08-23 LAB
ABO GROUP BLD: NORMAL
GLUCOSE SERPL-MCNC: 108 MG/DL (ref 65–140)
GLUCOSE SERPL-MCNC: 174 MG/DL (ref 65–140)
RH BLD: POSITIVE

## 2021-08-23 PROCEDURE — 0TT14ZZ RESECTION OF LEFT KIDNEY, PERCUTANEOUS ENDOSCOPIC APPROACH: ICD-10-PCS | Performed by: UROLOGY

## 2021-08-23 PROCEDURE — NC001 PR NO CHARGE: Performed by: UROLOGY

## 2021-08-23 PROCEDURE — 76998 US GUIDE INTRAOP: CPT | Performed by: UROLOGY

## 2021-08-23 PROCEDURE — NC001 PR NO CHARGE: Performed by: PHYSICIAN ASSISTANT

## 2021-08-23 PROCEDURE — 88307 TISSUE EXAM BY PATHOLOGIST: CPT | Performed by: PATHOLOGY

## 2021-08-23 PROCEDURE — 8E0W4CZ ROBOTIC ASSISTED PROCEDURE OF TRUNK REGION, PERCUTANEOUS ENDOSCOPIC APPROACH: ICD-10-PCS | Performed by: UROLOGY

## 2021-08-23 PROCEDURE — 50546 LAPAROSCOPIC NEPHRECTOMY: CPT | Performed by: UROLOGY

## 2021-08-23 PROCEDURE — 82948 REAGENT STRIP/BLOOD GLUCOSE: CPT

## 2021-08-23 RX ORDER — SODIUM CHLORIDE 9 MG/ML
INJECTION, SOLUTION INTRAVENOUS AS NEEDED
Status: DISCONTINUED | OUTPATIENT
Start: 2021-08-23 | End: 2021-08-23 | Stop reason: HOSPADM

## 2021-08-23 RX ORDER — HYDROMORPHONE HCL/PF 1 MG/ML
1 SYRINGE (ML) INJECTION
Status: DISCONTINUED | OUTPATIENT
Start: 2021-08-23 | End: 2021-08-24 | Stop reason: HOSPADM

## 2021-08-23 RX ORDER — HYDROMORPHONE HCL 110MG/55ML
PATIENT CONTROLLED ANALGESIA SYRINGE INTRAVENOUS AS NEEDED
Status: DISCONTINUED | OUTPATIENT
Start: 2021-08-23 | End: 2021-08-23

## 2021-08-23 RX ORDER — SIMETHICONE 80 MG
80 TABLET,CHEWABLE ORAL 4 TIMES DAILY PRN
Status: DISCONTINUED | OUTPATIENT
Start: 2021-08-23 | End: 2021-08-24 | Stop reason: HOSPADM

## 2021-08-23 RX ORDER — BUPIVACAINE HYDROCHLORIDE 5 MG/ML
INJECTION, SOLUTION EPIDURAL; INTRACAUDAL AS NEEDED
Status: DISCONTINUED | OUTPATIENT
Start: 2021-08-23 | End: 2021-08-23 | Stop reason: HOSPADM

## 2021-08-23 RX ORDER — MIDAZOLAM HYDROCHLORIDE 2 MG/2ML
INJECTION, SOLUTION INTRAMUSCULAR; INTRAVENOUS AS NEEDED
Status: DISCONTINUED | OUTPATIENT
Start: 2021-08-23 | End: 2021-08-23

## 2021-08-23 RX ORDER — ONDANSETRON 2 MG/ML
4 INJECTION INTRAMUSCULAR; INTRAVENOUS EVERY 6 HOURS PRN
Status: DISCONTINUED | OUTPATIENT
Start: 2021-08-23 | End: 2021-08-24 | Stop reason: HOSPADM

## 2021-08-23 RX ORDER — EPHEDRINE SULFATE 50 MG/ML
INJECTION INTRAVENOUS AS NEEDED
Status: DISCONTINUED | OUTPATIENT
Start: 2021-08-23 | End: 2021-08-23

## 2021-08-23 RX ORDER — HEPARIN SODIUM 5000 [USP'U]/ML
5000 INJECTION, SOLUTION INTRAVENOUS; SUBCUTANEOUS EVERY 8 HOURS SCHEDULED
Status: DISCONTINUED | OUTPATIENT
Start: 2021-08-23 | End: 2021-08-24 | Stop reason: HOSPADM

## 2021-08-23 RX ORDER — CEFAZOLIN SODIUM 2 G/50ML
2000 SOLUTION INTRAVENOUS ONCE
Status: COMPLETED | OUTPATIENT
Start: 2021-08-23 | End: 2021-08-23

## 2021-08-23 RX ORDER — ROCURONIUM BROMIDE 10 MG/ML
INJECTION, SOLUTION INTRAVENOUS AS NEEDED
Status: DISCONTINUED | OUTPATIENT
Start: 2021-08-23 | End: 2021-08-23

## 2021-08-23 RX ORDER — CEFAZOLIN SODIUM 1 G/50ML
1000 SOLUTION INTRAVENOUS EVERY 8 HOURS
Status: COMPLETED | OUTPATIENT
Start: 2021-08-23 | End: 2021-08-24

## 2021-08-23 RX ORDER — SODIUM CHLORIDE 9 MG/ML
INJECTION, SOLUTION INTRAVENOUS CONTINUOUS PRN
Status: DISCONTINUED | OUTPATIENT
Start: 2021-08-23 | End: 2021-08-23

## 2021-08-23 RX ORDER — SODIUM CHLORIDE 9 MG/ML
125 INJECTION, SOLUTION INTRAVENOUS CONTINUOUS
Status: DISCONTINUED | OUTPATIENT
Start: 2021-08-23 | End: 2021-08-23

## 2021-08-23 RX ORDER — FENTANYL CITRATE 50 UG/ML
INJECTION, SOLUTION INTRAMUSCULAR; INTRAVENOUS AS NEEDED
Status: DISCONTINUED | OUTPATIENT
Start: 2021-08-23 | End: 2021-08-23

## 2021-08-23 RX ORDER — SENNOSIDES 8.6 MG
1 TABLET ORAL DAILY
Status: DISCONTINUED | OUTPATIENT
Start: 2021-08-23 | End: 2021-08-24 | Stop reason: HOSPADM

## 2021-08-23 RX ORDER — OXYCODONE HYDROCHLORIDE 10 MG/1
10 TABLET ORAL EVERY 4 HOURS PRN
Status: DISCONTINUED | OUTPATIENT
Start: 2021-08-23 | End: 2021-08-24 | Stop reason: HOSPADM

## 2021-08-23 RX ORDER — LIDOCAINE HYDROCHLORIDE 10 MG/ML
INJECTION, SOLUTION EPIDURAL; INFILTRATION; INTRACAUDAL; PERINEURAL AS NEEDED
Status: DISCONTINUED | OUTPATIENT
Start: 2021-08-23 | End: 2021-08-23

## 2021-08-23 RX ORDER — ONDANSETRON 2 MG/ML
INJECTION INTRAMUSCULAR; INTRAVENOUS AS NEEDED
Status: DISCONTINUED | OUTPATIENT
Start: 2021-08-23 | End: 2021-08-23

## 2021-08-23 RX ORDER — ONDANSETRON 2 MG/ML
4 INJECTION INTRAMUSCULAR; INTRAVENOUS ONCE AS NEEDED
Status: COMPLETED | OUTPATIENT
Start: 2021-08-23 | End: 2021-08-23

## 2021-08-23 RX ORDER — PROPOFOL 10 MG/ML
INJECTION, EMULSION INTRAVENOUS AS NEEDED
Status: DISCONTINUED | OUTPATIENT
Start: 2021-08-23 | End: 2021-08-23

## 2021-08-23 RX ORDER — DEXAMETHASONE SODIUM PHOSPHATE 10 MG/ML
INJECTION, SOLUTION INTRAMUSCULAR; INTRAVENOUS AS NEEDED
Status: DISCONTINUED | OUTPATIENT
Start: 2021-08-23 | End: 2021-08-23

## 2021-08-23 RX ORDER — SODIUM CHLORIDE, SODIUM LACTATE, POTASSIUM CHLORIDE, CALCIUM CHLORIDE 600; 310; 30; 20 MG/100ML; MG/100ML; MG/100ML; MG/100ML
50 INJECTION, SOLUTION INTRAVENOUS CONTINUOUS
Status: DISCONTINUED | OUTPATIENT
Start: 2021-08-23 | End: 2021-08-24 | Stop reason: HOSPADM

## 2021-08-23 RX ORDER — HYDROMORPHONE HCL/PF 1 MG/ML
0.5 SYRINGE (ML) INJECTION
Status: DISCONTINUED | OUTPATIENT
Start: 2021-08-23 | End: 2021-08-23 | Stop reason: HOSPADM

## 2021-08-23 RX ORDER — ACETAMINOPHEN 325 MG/1
650 TABLET ORAL EVERY 6 HOURS SCHEDULED
Status: DISCONTINUED | OUTPATIENT
Start: 2021-08-23 | End: 2021-08-24 | Stop reason: HOSPADM

## 2021-08-23 RX ORDER — OXYCODONE HYDROCHLORIDE 5 MG/1
5 TABLET ORAL EVERY 4 HOURS PRN
Status: DISCONTINUED | OUTPATIENT
Start: 2021-08-23 | End: 2021-08-24 | Stop reason: HOSPADM

## 2021-08-23 RX ADMIN — ONDANSETRON 4 MG: 2 INJECTION INTRAMUSCULAR; INTRAVENOUS at 15:28

## 2021-08-23 RX ADMIN — ROCURONIUM BROMIDE 20 MG: 50 INJECTION, SOLUTION INTRAVENOUS at 09:17

## 2021-08-23 RX ADMIN — HYDROMORPHONE HYDROCHLORIDE 2 MG: 2 INJECTION INTRAMUSCULAR; INTRAVENOUS; SUBCUTANEOUS at 08:03

## 2021-08-23 RX ADMIN — HYDROMORPHONE HYDROCHLORIDE 0.5 MG: 1 INJECTION, SOLUTION INTRAMUSCULAR; INTRAVENOUS; SUBCUTANEOUS at 11:57

## 2021-08-23 RX ADMIN — SODIUM CHLORIDE: 0.9 INJECTION, SOLUTION INTRAVENOUS at 09:55

## 2021-08-23 RX ADMIN — DEXAMETHASONE SODIUM PHOSPHATE 4 MG: 10 INJECTION, SOLUTION INTRAMUSCULAR; INTRAVENOUS at 07:28

## 2021-08-23 RX ADMIN — SENNOSIDES 8.6 MG: 8.6 TABLET ORAL at 13:38

## 2021-08-23 RX ADMIN — ACETAMINOPHEN 650 MG: 325 TABLET, FILM COATED ORAL at 17:53

## 2021-08-23 RX ADMIN — HEPARIN SODIUM 5000 UNITS: 5000 INJECTION INTRAVENOUS; SUBCUTANEOUS at 13:43

## 2021-08-23 RX ADMIN — CEFAZOLIN SODIUM 2000 MG: 2 SOLUTION INTRAVENOUS at 11:06

## 2021-08-23 RX ADMIN — SODIUM CHLORIDE: 0.9 INJECTION, SOLUTION INTRAVENOUS at 07:32

## 2021-08-23 RX ADMIN — EPHEDRINE SULFATE 5 MG: 50 INJECTION, SOLUTION INTRAVENOUS at 08:09

## 2021-08-23 RX ADMIN — FENTANYL CITRATE 150 MCG: 50 INJECTION INTRAMUSCULAR; INTRAVENOUS at 07:28

## 2021-08-23 RX ADMIN — EPHEDRINE SULFATE 5 MG: 50 INJECTION, SOLUTION INTRAVENOUS at 08:12

## 2021-08-23 RX ADMIN — HYDROMORPHONE HYDROCHLORIDE 0.5 MG: 1 INJECTION, SOLUTION INTRAMUSCULAR; INTRAVENOUS; SUBCUTANEOUS at 11:41

## 2021-08-23 RX ADMIN — ROCURONIUM BROMIDE 10 MG: 50 INJECTION, SOLUTION INTRAVENOUS at 08:06

## 2021-08-23 RX ADMIN — SODIUM CHLORIDE: 0.9 INJECTION, SOLUTION INTRAVENOUS at 09:58

## 2021-08-23 RX ADMIN — LIDOCAINE HYDROCHLORIDE 100 MG: 10 INJECTION, SOLUTION EPIDURAL; INFILTRATION; INTRACAUDAL; PERINEURAL at 07:28

## 2021-08-23 RX ADMIN — HEPARIN SODIUM 5000 UNITS: 5000 INJECTION INTRAVENOUS; SUBCUTANEOUS at 20:18

## 2021-08-23 RX ADMIN — FENTANYL CITRATE 50 MCG: 50 INJECTION INTRAMUSCULAR; INTRAVENOUS at 08:01

## 2021-08-23 RX ADMIN — SODIUM CHLORIDE, SODIUM LACTATE, POTASSIUM CHLORIDE, AND CALCIUM CHLORIDE 150 ML/HR: .6; .31; .03; .02 INJECTION, SOLUTION INTRAVENOUS at 11:59

## 2021-08-23 RX ADMIN — SODIUM CHLORIDE 125 ML/HR: 0.9 INJECTION, SOLUTION INTRAVENOUS at 06:00

## 2021-08-23 RX ADMIN — ACETAMINOPHEN 650 MG: 325 TABLET, FILM COATED ORAL at 13:38

## 2021-08-23 RX ADMIN — ONDANSETRON 4 MG: 2 INJECTION INTRAMUSCULAR; INTRAVENOUS at 12:02

## 2021-08-23 RX ADMIN — CEFAZOLIN SODIUM 2000 MG: 2 SOLUTION INTRAVENOUS at 07:15

## 2021-08-23 RX ADMIN — ROCURONIUM BROMIDE 50 MG: 50 INJECTION, SOLUTION INTRAVENOUS at 07:28

## 2021-08-23 RX ADMIN — ONDANSETRON 4 MG: 2 INJECTION INTRAMUSCULAR; INTRAVENOUS at 07:28

## 2021-08-23 RX ADMIN — MIDAZOLAM 2 MG: 1 INJECTION INTRAMUSCULAR; INTRAVENOUS at 07:27

## 2021-08-23 RX ADMIN — CEFAZOLIN SODIUM 1000 MG: 1 SOLUTION INTRAVENOUS at 20:18

## 2021-08-23 RX ADMIN — HYDROMORPHONE HYDROCHLORIDE 1 MG: 1 INJECTION, SOLUTION INTRAMUSCULAR; INTRAVENOUS; SUBCUTANEOUS at 22:59

## 2021-08-23 RX ADMIN — SODIUM CHLORIDE, SODIUM LACTATE, POTASSIUM CHLORIDE, AND CALCIUM CHLORIDE 150 ML/HR: .6; .31; .03; .02 INJECTION, SOLUTION INTRAVENOUS at 17:47

## 2021-08-23 RX ADMIN — FENTANYL CITRATE 50 MCG: 50 INJECTION INTRAMUSCULAR; INTRAVENOUS at 09:30

## 2021-08-23 RX ADMIN — PROPOFOL 200 MG: 10 INJECTION, EMULSION INTRAVENOUS at 07:28

## 2021-08-23 RX ADMIN — FENTANYL CITRATE 50 MCG: 50 INJECTION INTRAMUSCULAR; INTRAVENOUS at 10:20

## 2021-08-23 NOTE — DISCHARGE INSTRUCTIONS
After surgery, you should be active when at home  You will need to take plenty of rest  No heavy lifting (weight limit is approximately a gallon jug of milk held in each hand ) You can shower but do not submerge in water; no tubs/pools/baths  You can walk up and down stairs  Your incision has disolvable sutures and surgical glue  If there is any concern about the incision (redness, drainage, bleeding) please call your surgeon's office  You can eat and drink whatever you like, but monitor for signs of constipation  You should be having daily bowel movements  Take your stool softeners until your bowel begin to function  If you are still constipated, call your surgeon's office to discuss additional medication  Nephrectomy   WHAT YOU NEED TO KNOW:   A nephrectomy is surgery to remove part or all of your kidney  DISCHARGE INSTRUCTIONS:   Call your local emergency number (911 in the [address] [place] [address],[address]) if:   · You have sudden chest pain or trouble breathing  Seek care immediately if:   · Blood soaks through your bandage  · Your stitches come apart  · You cannot urinate  Call your doctor or surgeon if:   · You have a fever  · You have blood in your urine  · Your surgery area is red, swollen, or draining pus  · You have chills, a cough, or feel weak and achy  · You have questions or concerns about your condition or care  Medicines: You may need any of the following:  · Prescription pain medicine  may be given  Ask your healthcare provider how to take this medicine safely  Some prescription pain medicines contain acetaminophen  Do not take other medicines that contain acetaminophen without talking to your healthcare provider  Too much acetaminophen may cause liver damage  Prescription pain medicine may cause constipation  Ask your healthcare provider how to prevent or treat constipation  · Antibiotics  may be given to prevent or treat an infection caused by bacteria      · Bowel movement softeners  may be given to help prevent constipation  · Take your medicine as directed  Contact your healthcare provider if you think your medicine is not helping or if you have side effects  Tell him or her if you are allergic to any medicine  Keep a list of the medicines, vitamins, and herbs you take  Include the amounts, and when and why you take them  Bring the list or the pill bottles to follow-up visits  Carry your medicine list with you in case of an emergency  Care for the surgery area:  Do not get your stitches wet unless your surgeon says it is okay  When you are allowed to shower, carefully wash the wound with soap and water  Gently pat the area dry and put on new, clean bandages as directed  Change your bandages when they get wet or dirty  Do not  sit in bathtubs, pools, or hot tubs until your surgeon says it is okay  Drink liquids as directed: This will help prevent constipation and help your other kidney work correctly  Ask your healthcare provider how much liquid to drink each day and which liquids are best for you  Do not drink alcohol  Alcohol can damage your other kidney  Nutrition:  You will be given instructions on food to limit or avoid after surgery to protect your other kidney  You may be told to have more vegetables and fruits to help protect the kidney  Activity:  Do not lift, pull, or push heavy objects  You may also need to limit movement, such as bending your back or twisting  Ask your healthcare provider when to can return to work or play sports  Follow up with your doctor or surgeon as directed: You will need to return to have your wound checked and stitches removed  Write down your questions so you remember to ask them during your visits  © Copyright [employer] 2021 Information is for End User's use only and may not be sold, redistributed or otherwise used for commercial purposes   All illustrations and images included in CareNotes® are the copyrighted property of A  [employer]  or [facility]  The above information is an  only  It is not intended as medical advice for individual conditions or treatments  Talk to your doctor, nurse or pharmacist before following any medical regimen to see if it is safe and effective for you

## 2021-08-23 NOTE — PLAN OF CARE
Problem: PAIN - ADULT  Goal: Verbalizes/displays adequate comfort level or baseline comfort level  Description: Interventions:  - Encourage patient to monitor pain and request assistance  - Assess pain using appropriate pain scale  - Administer analgesics based on type and severity of pain and evaluate response  - Implement non-pharmacological measures as appropriate and evaluate response  - Consider cultural and social influences on pain and pain management  - Notify physician/advanced practitioner if interventions unsuccessful or patient reports new pain  Outcome: Progressing     Problem: INFECTION - ADULT  Goal: Absence or prevention of progression during hospitalization  Description: INTERVENTIONS:  - Assess and monitor for signs and symptoms of infection  - Monitor lab/diagnostic results  - Monitor all insertion sites, i e  indwelling lines, tubes, and drains  - Monitor endotracheal if appropriate and nasal secretions for changes in amount and color  - Spring appropriate cooling/warming therapies per order  - Administer medications as ordered  - Instruct and encourage patient and family to use good hand hygiene technique  - Identify and instruct in appropriate isolation precautions for identified infection/condition  Outcome: Progressing  Goal: Absence of fever/infection during neutropenic period  Description: INTERVENTIONS:  - Monitor WBC    Outcome: Progressing     Problem: SAFETY ADULT  Goal: Patient will remain free of falls  Description: INTERVENTIONS:  - Educate patient/family on patient safety including physical limitations  - Instruct patient to call for assistance with activity   - Consult OT/PT to assist with strengthening/mobility   - Keep Call bell within reach  - Keep bed low and locked with side rails adjusted as appropriate  - Keep care items and personal belongings within reach  - Initiate and maintain comfort rounds  - Make Fall Risk Sign visible to staff  - Offer Toileting every  Hours, in advance of need  - Initiate/Maintain alarm  - Obtain necessary fall risk management equipment:   - Apply yellow socks and bracelet for high fall risk patients  - Consider moving patient to room near nurses station  Outcome: Progressing  Goal: Maintain or return to baseline ADL function  Description: INTERVENTIONS:  -  Assess patient's ability to carry out ADLs; assess patient's baseline for ADL function and identify physical deficits which impact ability to perform ADLs (bathing, care of mouth/teeth, toileting, grooming, dressing, etc )  - Assess/evaluate cause of self-care deficits   - Assess range of motion  - Assess patient's mobility; develop plan if impaired  - Assess patient's need for assistive devices and provide as appropriate  - Encourage maximum independence but intervene and supervise when necessary  - Involve family in performance of ADLs  - Assess for home care needs following discharge   - Consider OT consult to assist with ADL evaluation and planning for discharge  - Provide patient education as appropriate  Outcome: Progressing  Goal: Maintains/Returns to pre admission functional level  Description: INTERVENTIONS:  - Perform BMAT or MOVE assessment daily    - Set and communicate daily mobility goal to care team and patient/family/caregiver  - Collaborate with rehabilitation services on mobility goals if consulted  - Perform Range of Motion  times a day  - Reposition patient every  hours    - Dangle patient  times a day  - Stand patient  times a day  - Ambulate patient  times a day  - Out of bed to chair  times a day   - Out of bed for meals times a day  - Out of bed for toileting  - Record patient progress and toleration of activity level   Outcome: Progressing     Problem: DISCHARGE PLANNING  Goal: Discharge to home or other facility with appropriate resources  Description: INTERVENTIONS:  - Identify barriers to discharge w/patient and caregiver  - Arrange for needed discharge resources and transportation as appropriate  - Identify discharge learning needs (meds, wound care, etc )  - Arrange for interpretive services to assist at discharge as needed  - Refer to Case Management Department for coordinating discharge planning if the patient needs post-hospital services based on physician/advanced practitioner order or complex needs related to functional status, cognitive ability, or social support system  Outcome: Progressing     Problem: Knowledge Deficit  Goal: Patient/family/caregiver demonstrates understanding of disease process, treatment plan, medications, and discharge instructions  Description: Complete learning assessment and assess knowledge base  Interventions:  - Provide teaching at level of understanding  - Provide teaching via preferred learning methods  Outcome: Progressing     Problem: Nutrition/Hydration-ADULT  Goal: Nutrient/Hydration intake appropriate for improving, restoring or maintaining nutritional needs  Description: Monitor and assess patient's nutrition/hydration status for malnutrition  Collaborate with interdisciplinary team and initiate plan and interventions as ordered  Monitor patient's weight and dietary intake as ordered or per policy  Utilize nutrition screening tool and intervene as necessary  Determine patient's food preferences and provide high-protein, high-caloric foods as appropriate       INTERVENTIONS:  - Monitor oral intake, urinary output, labs, and treatment plans  - Assess nutrition and hydration status and recommend course of action  - Evaluate amount of meals eaten  - Assist patient with eating if necessary   - Allow adequate time for meals  - Recommend/ encourage appropriate diets, oral nutritional supplements, and vitamin/mineral supplements  - Order, calculate, and assess calorie counts as needed  - Recommend, monitor, and adjust tube feedings and TPN/PPN based on assessed needs  - Assess need for intravenous fluids  - Provide specific nutrition/hydration education as appropriate  - Include patient/family/caregiver in decisions related to nutrition  Outcome: Progressing

## 2021-08-23 NOTE — OP NOTE
OPERATIVE REPORT  PATIENT NAME: Nicki Sutton  :  1965  MRN: [id number]  Pt Location: [facility] OR ROOM 07    SURGERY DATE: 2021    Surgeon(s) and Role:     * Donna Dallas MD - Primary     * Elidia Burns MD - Assisting     * Richard Levi PA-C - Assisting    Preop Diagnosis:  Left renal mass [N28 89]    Post-Op Diagnosis Codes:     * Left renal mass [N28 89]    Procedure(s) (LRB):  RADICAL NEPHRECTOMY  LAPAROSCOPIC W ROBOTICS (Left)    Specimen(s):  ID Type Source Tests Collected by Time Destination   1 : fat overlying tumor Tissue Soft Tissue, Other TISSUE EXAM Donna Dallas MD 2021 1010    2 :  Tissue Kidney, Left TISSUE EXAM Donna Dallas MD 2021 1020        Estimated Blood Loss:   200 mL    Drains:  Urethral Catheter Non-latex 16 Fr  (Active)   Number of days: 0       Anesthesia Type:   General    Operative Indications:  Left renal mass [N28 89]      Operative Findings:  1  Three arterial supply to the left kidney, 1 caudal to the renal vein, 2 cephalad  2  Complete mobilization of the kidney to be able to flip the lateral aspect medial in order to visualize the upper pole renal lesion  3  Intraoperative ultrasound of the borders of the renal mass which were defined  4  Intraoperative decision making given the inability to further mobilize the kidney due to the multiple points of fixation from the 3 arteries, renal vein and lower pole ureter as well as the extent of the upper pole mass and likely inability to perform a affective renorrhaphy, decision was made to convert to radical nephrectomy  5  200 cc of estimated blood loss    Complications:   None    Procedure and Technique:  Nicki Sutton   is a 55y o  -year-old male   CT imaging revealed a 3 7 cm left upper pole renal mass suspicious for a renal cell carcinoma  Risk and benefits of observation versus partial nephrectomy were discussed and reviewed   The patient opted to undergo da Travis robot-assisted laparoscopic partial nephrectomy  Informed consent was obtained  Additional imaging was performed incidental findings  Patient was found to have 3 arteries supplying blood flow to the left kidney  We did discuss the possibility of need for advanced maneuvers or difficulty with partial nephrectomy given the anatomy  The patient was brought to the operating room on 8/23/2021  After the smooth induction of general endotracheal anesthesia, patient was placed in a modified right lateral decubitus position  Intravenous antibiotics were administered  A timeout was performed with all members of the operative team confirming the patient's identity, procedure to be performed, and laterality of the case  Appropriate anesthetic monitoring lines were placed  A gel roll and foam wedge was placed under the patient's back with left side up  A Wallace catheter was inserted at the start of the case  The bottom leg was gently flexed  Venodyne boots had been applied  All pressure points were padded with pink pads  The top leg was placed straight  The right arm was brought across the patient's body and supported with a thoracic arm   A hand was placed easily into the axilla and an axillary roll was not required  The bed had been flexed and the kidney rest elevated  The patient was secured to the bed with towels padding and tape  A Veress needle was placed in the left mid abdomen  A pneumoperitoneum was created  Straight line ports were placed approximately 4 fingerbreadths lateral to the umbilicus  The 1st port was placed 1-2 fingerbreadths below the costal margin  His next 3 for ports were then placed along the same line approximately 4 fingerbreadths from 1 another  A 12 mm air seal assistant port was placed between the camera port and the right robotic arm  The patient was rolled to a near vertical position  The robot was docked  The splint flexure was mobilized   The colon was reflected medially along the white line of Toldt  The kidney was elevated with the 4th arm of the robot and dissection was performed in the region of the hilum  We encountered the 1st renal artery caudal to the renal vein  Borders of the renal vein were then dissected cephalad  We continued to mobilize along the adrenal plane and encountered both cephalad arteries the middle of which had an early branch point  These were all well skeletonized to visualize inflow to the kidney  Next I mobilized the kidney  Because of the superior posterior aspect of the renal mass we had to fully mobilize the kidney along the adrenal plane, the lateral sidewall and the inferior pole so the kidney could be opened like a book in the medial position  Unfortunately, the kidney could not be rotated given the multiple points of fixation from the hilar supply and ureter  With downward traction on the kidney parenchyma, we were able to identify the renal mass  There was a large amount of infiltrative fat above the renal mass  This was transected to better allow visualization and sent off as fat overlying kidney lesion  At this point I used the robotic ultrasound  In the expected location of the lesion based on preoperative axial imaging, I utilized the robotic ultrasound to identify the borders of the renal mass  This was directly abutting the most cephalad renal artery  Throughout the course of this, and additional left lower quadrant 5 mm assistant port was placed under direct vision to allow for assistant suction posterior to the kidney  At this point time, careful intraoperative decision making was made with both myself my partner, Dr Pasha Sarkar  We were very thoughtful about the decision to move forward with possible partial nephrectomy given the extent of the borders of the renal mass, the inability to completely mobilize the kidney given the points of fixation and proximity to the most cephalad renal hilum    In a controlled fashion, the decision was made not to attempt partial nephrectomy in to rather convert to a robotic left radical nephrectomy  As the renal hilum had already been well dissected, vascular staple loads were used to take the most caudal of the 3 renal arteries  The 2 cephalad renal arteries were taken and block and the renal vein was taken last   Kidney had already been completely mobilized with the exception of the gonadal and ureter complex inferiorly  A 4th staple fire was used to transect these structures  The kidney was now freely mobile in the abdomen  Kidney was placed into an Endo-Catch bag and positioned into the pelvis  At this point time, the pneumoperitoneum was dropped to 5 mmHg  There was no active bleeding  Careful irrigation was performed along the adrenal bed and hilum  Surgiflo was placed along the structures for hemostasis  The robot was undocked  The 12 mm assistant AirSeal port was opened in the vertical position in the midline minimally just to allow for removal of the specimen within the Endo Catch bag  The fascia in the midline was closed with a running #1 PDS suture  Deep dermal layer was performed with a 2 0 Vicryl suture in the extraction site  All 8 mm robotic ports and the 5 mm assistant was then irrigated and the skin was closed with 4-0 Monocryl and histacryl  The patient was placed supine  Overall the patient tolerated the procedure well and were no complications  He was extubated in the operating room and transferred to the PACU in stable condition at the conclusion of the case  I was present for the entire procedure, A qualified resident physician was not available, A co-surgeon (Dr Omar Marcus) was required because of skills and techniques relevant to speciality especially given the arterial complexity as well as complexity of the mass   A physician assistant was required during the procedure for retraction tissue handling,dissection and suturing    Patient Disposition:  PACU SIGNATURE: Sakshi Silveira MD  DATE: August 23, 2021  TIME: 10:58 AM

## 2021-08-23 NOTE — ANESTHESIA PREPROCEDURE EVALUATION
Procedure:  NEPHRECTOMY PARTIAL LAPAROSCOPIC W ROBOTICS (Left Abdomen)    Relevant Problems   ENDO   (+) Type 2 diabetes mellitus without complication, without long-term current use of insulin (HCC)      Other   (+) Left renal mass        Physical Exam    Airway    Mallampati score: II  TM Distance: >3 FB  Neck ROM: full     Dental       Cardiovascular  Rhythm: regular, Rate: normal, Cardiovascular exam normal    Pulmonary  Pulmonary exam normal Breath sounds clear to auscultation,     Other Findings        Anesthesia Plan  ASA Score- 3     Anesthesia Type- general with ASA Monitors  Additional Monitors:   Airway Plan: ETT  Plan Factors-Exercise tolerance (METS): >4 METS  Chart reviewed  Existing labs reviewed  Patient is not a current smoker  Obstructive sleep apnea risk education given perioperatively  Induction- intravenous  Postoperative Plan-     Informed Consent- Anesthetic plan and risks discussed with patient

## 2021-08-23 NOTE — QUICK NOTE
Patient's wife and daughter updated immediately postoperatively in person  The patient was groggy in recovery, I re-contacted him by phone this afternoon to discuss the intraoperative findings and answer any questions

## 2021-08-23 NOTE — H&P
UROLOGY PREOPERATIVE H&P NOTE     CHIEF COMPLAINT   Ranjana Coffey  is a 55 y o  male with a complaint of No chief complaint on file  History of Present Illness:     55 y o  male seen in the emergency room and diagnosed with COVID-19 in early May 2020  Patient has had multiple family members who have had the disease and two more remote family members who have passed away  Patient has right and left-sided back pain  He is a nonsmoker and has no family history of kidney cancer  Incidental left upper pole renal mass was noted at the time of his ER imaging  After additional imaging including directed CT renal protocol and follow-up bone scan for a thoracic benign bony lesion, the patient presents today for partial nephrectomy      Past Medical History:     Past Medical History:   Diagnosis Date    Diabetes mellitus ([facility] [address] )     TYPE 2-borderline   diet controlled    Left kidney mass     L partial nephrectomy today 8/23/2021       PAST SURGICAL HISTORY:     Past Surgical History:   Procedure Laterality Date    COLONOSCOPY      TONSILLECTOMY         CURRENT MEDICATIONS:     Current Facility-Administered Medications   Medication Dose Route Frequency Provider Last Rate Last Admin    ceFAZolin (ANCEF) IVPB (premix in dextrose) 2,000 mg 50 mL  2,000 mg Intravenous Once Candice Park MD        sodium chloride 0 9 % infusion  125 mL/hr Intravenous Continuous Rhiannon Horne  mL/hr at 08/23/21 0701 Continue from Pre-op at 08/23/21 0701       ALLERGIES:   No Known Allergies    SOCIAL HISTORY:     Social History     Socioeconomic History    Marital status: /Civil Union     Spouse name: None    Number of children: None    Years of education: None    Highest education level: None   Occupational History    None   Tobacco Use    Smoking status: Never Smoker    Smokeless tobacco: Never Used   Vaping Use    Vaping Use: Never used   Substance and Sexual Activity    Alcohol use: Not Currently    Drug use: Never    Sexual activity: None   Other Topics Concern    None   Social History Narrative    None     Social Determinants of Health     Financial Resource Strain:     Difficulty of Paying Living Expenses:    Food Insecurity:     Worried About Running Out of Food in the Last Year:     [address] in the Last Year:    Transportation Needs:     Lack of Transportation (Medical):  Lack of Transportation (Non-Medical):    Physical Activity:     Days of Exercise per Week:     Minutes of Exercise per Session:    Stress:     Feeling of Stress :    Social Connections:     Frequency of Communication with Friends and Family:     Frequency of Social Gatherings with Friends and Family:     Attends Church Services:     Active Member of Clubs or Organizations:     Attends Club or Organization Meetings:     Marital Status:    Intimate Partner Violence:     Fear of Current or Ex-Partner:     Emotionally Abused:     Physically Abused:     Sexually Abused:        SOCIAL HISTORY:   History reviewed  No pertinent family history  REVIEW OF SYSTEMS:     Review of Systems   Constitutional: Negative  Respiratory: Negative  Cardiovascular: Negative  Gastrointestinal: Negative  Genitourinary: Negative  Musculoskeletal: Negative  Negative for back pain  Skin: Negative  Psychiatric/Behavioral: Negative  PHYSICAL EXAM:     /88   Pulse 72   Temp 98 4 °F (36 9 °C) (Temporal)   Resp 16   SpO2 99%     General:  Healthy appearing male in no acute distress  They have a normal affect  There is not appear to be any gross neurologic defects or abnormalities  HEENT:  Normocephalic, atraumatic  Neck is supple without any palpable lymphadenopathy  Cardiovascular:  Patient has normal palpable distal radial pulses  There is no significant peripheral edema  No JVD is noted  Respiratory:  Patient has unlabored respirations    There is no audible wheeze or rhonchi  Abdomen:  Abdomen is soft and nontender  There is no tympany  Inguinal and umbilical hernia are not appreciated  Musculoskeletal:  Patient does not have significant CVA tenderness in the  flank with palpation or percussion  They full range of motion in all 4 extremities  Strength in all 4 extremities appears congruent  Patient is able to ambulate without assistance or difficulty  Dermatologic:  Patient has no skin abnormalities or rashes  LABS:     CBC:   Lab Results   Component Value Date    WBC 4 70 2021    HGB 15 9 2021    HCT 48 9 2021    MCV 90 2021     2021       BMP:   Lab Results   Component Value Date    CALCIUM 8 7 2021    K 4 4 2021    CO2 30 2021     2021    BUN 22 2021    CREATININE 0 99 2021       IMAGIN/8/21  CT - ABDOMEN WITHOUT AND WITH IV CONTRAST     INDICATION:   N28 89: Other specified disorders of kidney and ureter  Suspected neoplasm noted in left kidney on prior CT ; patient scheduled for surgery     COMPARISON: 5/10/2021     TECHNIQUE: CT examination of the abdomen was performed  Reformatted images were created in axial, sagittal, and coronal planes  Imaging is performed from the domes of the diaphragm to the iliac crests      Radiation dose length product (DLP) for this visit:  1370 61 mGy-cm   This examination, like all CT scans performed in the [facility], was performed utilizing techniques to minimize radiation dose exposure, including the use of   iterative reconstruction and automated exposure control      IV Contrast:  85 mL of iohexol (OMNIPAQUE)  Enteric Contrast:     FINDINGS:     ABDOMEN     LOWER CHEST: A few groundglass opacities are noted at the left lung base similar to the prior study may be related to prior Covid infection    Small groundglass opacity is also seen laterally in the right middle lobe      LIVER/BILIARY TREE:  Tiny circumscribed hypodensity is noted in the on image 23 more posteriorly  This persists with delayed imaging and most likely represent cysts      GALLBLADDER:  No calcified gallstones  No pericholecystic inflammatory change      SPLEEN:  Unremarkable      PANCREAS:  Unremarkable      ADRENAL GLANDS:  Unremarkable      KIDNEYS/URETERS:  The right kidney is unremarkable dissection of some scarring laterally  Small calyceal diverticulum enhances on delayed images near the scarring      There is a left renal mass in the upper pole on image 44 which enhances with some slight heterogeneity  This measures 3 4 x 3 x 2 9 cm and is exophytic  There is no evidence of renal vein thrombosis      Left parapelvic cyst is seen within the kidney      Delayed images demonstrate no filling defect in the collecting system      VISUALIZED STOMACH AND BOWEL:  Unremarkable      VISUALIZED ABDOMINAL CAVITY:  No pathologically enlarged periportal, mesenteric or upper retroperitoneal lymph nodes  No ascites or free intraperitoneal air  No fluid collection  Small retroperitoneal nodes adjacent to the left kidney do not reach   pathologic size criteria with a small fatty notch      VISUALIZED BODY WALL:  Unremarkable      VISUALIZED ABDOMINAL VESSELS: No aneurysm      OSSEOUS STRUCTURES:  There is a circumscribed lesion involving T11 measuring 1 4 cm in size  There is also a lesion involving L4 measuring 9 mm in size but does reach the vertebral endplates more likely a Schmorl's node        IMPRESSION:     Left renal mass with definite enhancement is suspicious for neoplasm  There is no renal vein thrombosis  Small adjacent nodes are seen without karly adenopathy      Indeterminant osseous lesion is noted at T11  Bone scanning should be considered  A hemangioma is still possible      5/10/21  CT ABDOMEN AND PELVIS WITH IV CONTRAST     IMPRESSION:     No acute inflammatory stranding  Diverticulosis  3 6 x 3 7 x 2 4 cm solid renal mass upper pole left kidney, likely neoplasm  No retroperitoneal lymph node enlargement seen  No renal vein invasion  Urology evaluation suggested  Nodular areas of groundglass density seen at the left lung base, right lung base typical for Covid associated infiltrate  Correlate clinically to exclude other etiologies     Follow-up chest CT at 3 months to demonstrate resolution        ASSESSMENT:     55 y o  male with COVID-19 positivity and 3 7cm LEFT upper pole renal mass    PLAN:     I discussed again the findings of the imaging with the patient  There is a left upper pole posterior mass  Our goal today for surgery is a robotic assisted laparoscopic partial nephrectomy  Given the posterior location in the 3 vessel arterial inflow, I have counseled the patient about the possibility of the need for conversion to radical nephrectomy  We discussed risks and benefits of surgery including bleeding, bleeding requiring transfusion, infection, damage to surrounding structures including spleen, stomach, pancreas, large and small intestine, major blood vessels and nerves  We discussed the risk of urinary leak requiring additional procedures as well as pseudoaneurysm or AV fistula creation requiring additional procedures as well  We discussed the risk of cancer pathology and need for additional treatment but also the risk of a benign pathology  Patient understands all these risks  His left side was marked    Informed consent previously signed

## 2021-08-24 VITALS
OXYGEN SATURATION: 98 % | SYSTOLIC BLOOD PRESSURE: 141 MMHG | DIASTOLIC BLOOD PRESSURE: 85 MMHG | RESPIRATION RATE: 20 BRPM | HEART RATE: 66 BPM | TEMPERATURE: 98.3 F

## 2021-08-24 LAB
ANION GAP SERPL CALCULATED.3IONS-SCNC: 10 MMOL/L (ref 4–13)
BUN SERPL-MCNC: 14 MG/DL (ref 5–25)
CALCIUM SERPL-MCNC: 8.1 MG/DL (ref 8.3–10.1)
CHLORIDE SERPL-SCNC: 105 MMOL/L (ref 100–108)
CO2 SERPL-SCNC: 25 MMOL/L (ref 21–32)
CREAT SERPL-MCNC: 1.36 MG/DL (ref 0.6–1.3)
ERYTHROCYTE [DISTWIDTH] IN BLOOD BY AUTOMATED COUNT: 13.6 % (ref 11.6–15.1)
GFR SERPL CREATININE-BSD FRML MDRD: 58 ML/MIN/1.73SQ M
GLUCOSE SERPL-MCNC: 98 MG/DL (ref 65–140)
HCT VFR BLD AUTO: 37.5 % (ref 36.5–49.3)
HGB BLD-MCNC: 12 G/DL (ref 12–17)
MCH RBC QN AUTO: 29 PG (ref 26.8–34.3)
MCHC RBC AUTO-ENTMCNC: 32 G/DL (ref 31.4–37.4)
MCV RBC AUTO: 91 FL (ref 82–98)
PLATELET # BLD AUTO: 143 THOUSANDS/UL (ref 149–390)
PMV BLD AUTO: 10.8 FL (ref 8.9–12.7)
POTASSIUM SERPL-SCNC: 4.6 MMOL/L (ref 3.5–5.3)
RBC # BLD AUTO: 4.14 MILLION/UL (ref 3.88–5.62)
SODIUM SERPL-SCNC: 140 MMOL/L (ref 136–145)
WBC # BLD AUTO: 9.09 THOUSAND/UL (ref 4.31–10.16)

## 2021-08-24 PROCEDURE — 85027 COMPLETE CBC AUTOMATED: CPT | Performed by: UROLOGY

## 2021-08-24 PROCEDURE — NC001 PR NO CHARGE: Performed by: PHYSICIAN ASSISTANT

## 2021-08-24 PROCEDURE — 99024 POSTOP FOLLOW-UP VISIT: CPT | Performed by: PHYSICIAN ASSISTANT

## 2021-08-24 PROCEDURE — 80048 BASIC METABOLIC PNL TOTAL CA: CPT | Performed by: UROLOGY

## 2021-08-24 RX ORDER — SENNA PLUS 8.6 MG/1
1 TABLET ORAL DAILY
Qty: 14 TABLET | Refills: 0 | Status: SHIPPED | OUTPATIENT
Start: 2021-08-24 | End: 2022-05-06

## 2021-08-24 RX ORDER — HYDROCODONE BITARTRATE AND ACETAMINOPHEN 5; 325 MG/1; MG/1
1 TABLET ORAL EVERY 6 HOURS PRN
Qty: 16 TABLET | Refills: 0 | Status: SHIPPED | OUTPATIENT
Start: 2021-08-24 | End: 2021-08-28

## 2021-08-24 RX ADMIN — ACETAMINOPHEN 650 MG: 325 TABLET, FILM COATED ORAL at 04:39

## 2021-08-24 RX ADMIN — ACETAMINOPHEN 650 MG: 325 TABLET, FILM COATED ORAL at 00:18

## 2021-08-24 RX ADMIN — OXYCODONE HYDROCHLORIDE 5 MG: 5 TABLET ORAL at 03:07

## 2021-08-24 RX ADMIN — ACETAMINOPHEN 650 MG: 325 TABLET, FILM COATED ORAL at 12:08

## 2021-08-24 RX ADMIN — OXYCODONE HYDROCHLORIDE 10 MG: 10 TABLET ORAL at 01:10

## 2021-08-24 RX ADMIN — SENNOSIDES 8.6 MG: 8.6 TABLET ORAL at 08:41

## 2021-08-24 RX ADMIN — HEPARIN SODIUM 5000 UNITS: 5000 INJECTION INTRAVENOUS; SUBCUTANEOUS at 04:39

## 2021-08-24 RX ADMIN — CEFAZOLIN SODIUM 1000 MG: 1 SOLUTION INTRAVENOUS at 04:39

## 2021-08-24 RX ADMIN — HEPARIN SODIUM 5000 UNITS: 5000 INJECTION INTRAVENOUS; SUBCUTANEOUS at 13:18

## 2021-08-24 NOTE — PROGRESS NOTES
Progress Note - Urology  Doris Zapata  1965, 55 y o  male MRN: [id number]    Unit/Bed#: [facility] Encounter: [id number]    * Left renal mass  Assessment & Plan  · POD#1 laparoscopic left radical nephrectomy with Dr Caterina Jeffers  · Wallace catheter patent, draining clear yellow urine  · 1150 mL UOP overnight  · Surgical sites clean dry and intact with appropriate mila-incisional tenderness  · Afebrile without leukocytosis  Creatinine 1 36  Hemoglobin 12 0   · Pain well controlled  Tolerating oral diet without difficulty  Minimal ambulation yesterday in patient's hospital room  Denies bowel movements or flatus  · Encourage ambulation this morning, discussed with nursing  Tentative plans for discharge later today  Subjective:   HPI:  Patient is feeling well this morning  He states he has a 4/10 left upper quadrant pain when he sits, and more mila-incisional pain when he moves  Both of which he feels is well controlled with pain medication  He also notes pressure in his abdomen but denies bloating  He has been tolerating oral diet without difficulty  He has ambulated around his room but not out in the halls yet this morning  Denies any bowel movements or flatus  His Wallace catheter is patent, draining clear yellow urine  Review of Systems   Constitutional: Negative for chills and fever  HENT: Negative for congestion and sore throat  Eyes: Negative for pain and visual disturbance  Respiratory: Negative for cough and shortness of breath  Cardiovascular: Negative for chest pain and palpitations  Gastrointestinal: Positive for abdominal pain (4/10 LUQ pain and mila-incisional pain)  Negative for abdominal distention (Denies bloating but notes increased abdominal pressure), nausea and vomiting  Genitourinary: Negative for decreased urine volume, flank pain and hematuria  Musculoskeletal: Negative for back pain  Skin:        Mild tenderness mila-incisionally  Neurological: Negative for dizziness and light-headedness  Objective:  Nursing Rounds:  Plan communicated with Jennifer Wright RN  Vitals: Blood pressure 141/85, pulse 66, temperature 98 3 °F (36 8 °C), resp  rate 20, SpO2 98 %  ,There is no height or weight on file to calculate BMI  Physical Exam  Vitals reviewed  Constitutional:       General: He is not in acute distress  Appearance: Normal appearance  He is not ill-appearing, toxic-appearing or diaphoretic  HENT:      Head: Normocephalic and atraumatic  Eyes:      Conjunctiva/sclera: Conjunctivae normal    Cardiovascular:      Rate and Rhythm: Normal rate and regular rhythm  Heart sounds: Normal heart sounds  No murmur heard  No friction rub  No gallop  Pulmonary:      Effort: Pulmonary effort is normal  No respiratory distress  Breath sounds: Normal breath sounds  No stridor  No wheezing, rhonchi or rales  Abdominal:      General: Bowel sounds are normal  There is no distension  Palpations: Abdomen is soft  Tenderness: There is abdominal tenderness (Mild LUQ pain  Mild mila-incisional pain with palpation)  There is no right CVA tenderness, left CVA tenderness, guarding or rebound  Comments: Surgical sites clean dry and intact  Appropriate mila-incisional tenderness  Genitourinary:     Comments: Wallace catheter patent, draining clear yellow urine  Musculoskeletal:         General: Normal range of motion  Cervical back: Normal range of motion  Skin:     General: Skin is warm and dry  Neurological:      General: No focal deficit present  Mental Status: He is alert and oriented to person, place, and time  Psychiatric:         Mood and Affect: Mood normal          Behavior: Behavior normal          Thought Content: Thought content normal          Judgment: Judgment normal          Imaging:  None today       Labs:  Recent Labs     08/24/21  0454   WBC 9 09       Recent Labs     08/24/21  0454 HGB 12 0     Recent Labs     08/24/21  0454   HCT 37 5     Recent Labs     08/24/21  0454   CREATININE 1 36*       History:    Past Medical History:   Diagnosis Date    Diabetes mellitus ([facility] [address] )     TYPE 2-borderline   diet controlled    Left kidney mass     L partial nephrectomy today 8/23/2021     Social History     Socioeconomic History    Marital status: /Civil Union     Spouse name: None    Number of children: None    Years of education: None    Highest education level: None   Occupational History    None   Tobacco Use    Smoking status: Never Smoker    Smokeless tobacco: Never Used   Vaping Use    Vaping Use: Never used   Substance and Sexual Activity    Alcohol use: Not Currently    Drug use: Never    Sexual activity: Yes   Other Topics Concern    None   Social History Narrative    None     Social Determinants of Health     Financial Resource Strain:     Difficulty of Paying Living Expenses:    Food Insecurity:     Worried About Running Out of Food in the Last Year:     Ran Out of Food in the Last Year:    Transportation Needs:     Lack of Transportation (Medical):  Lack of Transportation (Non-Medical):    Physical Activity:     Days of Exercise per Week:     Minutes of Exercise per Session:    Stress:     Feeling of Stress :    Social Connections:     Frequency of Communication with Friends and Family:     Frequency of Social Gatherings with Friends and Family:     Attends Baptism Services:     Active Member of Clubs or Organizations:     Attends Club or Organization Meetings:     Marital Status:    Intimate Partner Violence:     Fear of Current or Ex-Partner:     Emotionally Abused:     Physically Abused:     Sexually Abused:      Past Surgical History:   Procedure Laterality Date    COLONOSCOPY      TONSILLECTOMY       History reviewed  No pertinent family history      Keven Jacome PA-C  Date: 8/24/2021 Time: 9:16 AM

## 2021-08-24 NOTE — ASSESSMENT & PLAN NOTE
· POD#1 laparoscopic left radical nephrectomy with Dr Suellen Baxter  · Wallace catheter patent, draining clear yellow urine  · 1150 mL UOP overnight  · Surgical sites clean dry and intact with appropriate mila-incisional tenderness  · Afebrile without leukocytosis  Creatinine 1 36  Hemoglobin 12 0   · Pain well controlled  Tolerating oral diet without difficulty  Minimal ambulation yesterday in patient's hospital room  Denies bowel movements or flatus  · Encourage ambulation this morning, discussed with nursing  Tentative plans for discharge later today

## 2021-08-24 NOTE — PLAN OF CARE
Problem: PAIN - ADULT  Goal: Verbalizes/displays adequate comfort level or baseline comfort level  Description: Interventions:  - Encourage patient to monitor pain and request assistance  - Assess pain using appropriate pain scale  - Administer analgesics based on type and severity of pain and evaluate response  - Implement non-pharmacological measures as appropriate and evaluate response  - Consider cultural and social influences on pain and pain management  - Notify physician/advanced practitioner if interventions unsuccessful or patient reports new pain  8/24/2021 1404 by Osmani Doyle RN  Outcome: Adequate for Discharge  8/24/2021 1009 by Osmani Doyle RN  Outcome: Progressing     Problem: INFECTION - ADULT  Goal: Absence or prevention of progression during hospitalization  Description: INTERVENTIONS:  - Assess and monitor for signs and symptoms of infection  - Monitor lab/diagnostic results  - Monitor all insertion sites, i e  indwelling lines, tubes, and drains  - Monitor endotracheal if appropriate and nasal secretions for changes in amount and color  - [place] appropriate cooling/warming therapies per order  - Administer medications as ordered  - Instruct and encourage patient and family to use good hand hygiene technique  - Identify and instruct in appropriate isolation precautions for identified infection/condition  8/24/2021 1404 by Osmani Doyle RN  Outcome: Adequate for Discharge  8/24/2021 1009 by Osmani Doyle RN  Outcome: Progressing  Goal: Absence of fever/infection during neutropenic period  Description: INTERVENTIONS:  - Monitor WBC    8/24/2021 1404 by Osmani Doyle RN  Outcome: Adequate for Discharge  8/24/2021 1009 by Osmani Doyle RN  Outcome: Progressing     Problem: SAFETY ADULT  Goal: Patient will remain free of falls  Description: INTERVENTIONS:  - Educate patient/family on patient safety including physical limitations  - Instruct patient to call for assistance with activity   - Consult OT/PT to assist with strengthening/mobility   - Keep Call bell within reach  - Keep bed low and locked with side rails adjusted as appropriate  - Keep care items and personal belongings within reach  - Initiate and maintain comfort rounds  - Make Fall Risk Sign visible to staff  - Offer Toileting every  Hours, in advance of need  - Initiate/Maintain alarm  - Obtain necessary fall risk management equipment:  - Apply yellow socks and bracelet for high fall risk patients  - Consider moving patient to room near nurses station  8/24/2021 1404 by Afsaneh Thompson RN  Outcome: Adequate for Discharge  8/24/2021 1009 by Afsaneh Thompson RN  Outcome: Progressing  Goal: Maintain or return to baseline ADL function  Description: INTERVENTIONS:  -  Assess patient's ability to carry out ADLs; assess patient's baseline for ADL function and identify physical deficits which impact ability to perform ADLs (bathing, care of mouth/teeth, toileting, grooming, dressing, etc )  - Assess/evaluate cause of self-care deficits   - Assess range of motion  - Assess patient's mobility; develop plan if impaired  - Assess patient's need for assistive devices and provide as appropriate  - Encourage maximum independence but intervene and supervise when necessary  - Involve family in performance of ADLs  - Assess for home care needs following discharge   - Consider OT consult to assist with ADL evaluation and planning for discharge  - Provide patient education as appropriate  8/24/2021 1404 by Afsaneh Thompson RN  Outcome: Adequate for Discharge  8/24/2021 1009 by Afsaneh Thompson RN  Outcome: Progressing  Goal: Maintains/Returns to pre admission functional level  Description: INTERVENTIONS:  - Perform BMAT or MOVE assessment daily    - Set and communicate daily mobility goal to care team and patient/family/caregiver     - Collaborate with rehabilitation services on mobility goals if consulted  - Perform Range of Motion times a day  - Reposition patient every  hours  - Dangle patient  times a day  - Stand patient  times a day  - Ambulate patient  times a day  - Out of bed to chair  times a day   - Out of bed for meals  times a day  - Out of bed for toileting  - Record patient progress and toleration of activity level   8/24/2021 1404 by Jennifer Wright RN  Outcome: Adequate for Discharge  8/24/2021 1009 by Jennifer Wright RN  Outcome: Progressing     Problem: DISCHARGE PLANNING  Goal: Discharge to home or other facility with appropriate resources  Description: INTERVENTIONS:  - Identify barriers to discharge w/patient and caregiver  - Arrange for needed discharge resources and transportation as appropriate  - Identify discharge learning needs (meds, wound care, etc )  - Arrange for interpretive services to assist at discharge as needed  - Refer to Case Management Department for coordinating discharge planning if the patient needs post-hospital services based on physician/advanced practitioner order or complex needs related to functional status, cognitive ability, or social support system  8/24/2021 1404 by Jennifer Wright RN  Outcome: Adequate for Discharge  8/24/2021 1009 by Jennifer Wright RN  Outcome: Progressing     Problem: Knowledge Deficit  Goal: Patient/family/caregiver demonstrates understanding of disease process, treatment plan, medications, and discharge instructions  Description: Complete learning assessment and assess knowledge base    Interventions:  - Provide teaching at level of understanding  - Provide teaching via preferred learning methods  8/24/2021 1404 by Jeninfer Wright RN  Outcome: Adequate for Discharge  8/24/2021 1009 by Jennifer Wright RN  Outcome: Progressing     Problem: Nutrition/Hydration-ADULT  Goal: Nutrient/Hydration intake appropriate for improving, restoring or maintaining nutritional needs  Description: Monitor and assess patient's nutrition/hydration status for malnutrition  Collaborate with interdisciplinary team and initiate plan and interventions as ordered  Monitor patient's weight and dietary intake as ordered or per policy  Utilize nutrition screening tool and intervene as necessary  Determine patient's food preferences and provide high-protein, high-caloric foods as appropriate       INTERVENTIONS:  - Monitor oral intake, urinary output, labs, and treatment plans  - Assess nutrition and hydration status and recommend course of action  - Evaluate amount of meals eaten  - Assist patient with eating if necessary   - Allow adequate time for meals  - Recommend/ encourage appropriate diets, oral nutritional supplements, and vitamin/mineral supplements  - Order, calculate, and assess calorie counts as needed  - Recommend, monitor, and adjust tube feedings and TPN/PPN based on assessed needs  - Assess need for intravenous fluids  - Provide specific nutrition/hydration education as appropriate  - Include patient/family/caregiver in decisions related to nutrition  8/24/2021 1404 by Meri Bentley RN  Outcome: Adequate for Discharge  8/24/2021 1009 by Meri Bentley RN  Outcome: Progressing

## 2021-08-24 NOTE — PLAN OF CARE
Problem: PAIN - ADULT  Goal: Verbalizes/displays adequate comfort level or baseline comfort level  Description: Interventions:  - Encourage patient to monitor pain and request assistance  - Assess pain using appropriate pain scale  - Administer analgesics based on type and severity of pain and evaluate response  - Implement non-pharmacological measures as appropriate and evaluate response  - Consider cultural and social influences on pain and pain management  - Notify physician/advanced practitioner if interventions unsuccessful or patient reports new pain  Outcome: Progressing     Problem: INFECTION - ADULT  Goal: Absence or prevention of progression during hospitalization  Description: INTERVENTIONS:  - Assess and monitor for signs and symptoms of infection  - Monitor lab/diagnostic results  - Monitor all insertion sites, i e  indwelling lines, tubes, and drains  - Monitor endotracheal if appropriate and nasal secretions for changes in amount and color  - [place] appropriate cooling/warming therapies per order  - Administer medications as ordered  - Instruct and encourage patient and family to use good hand hygiene technique  - Identify and instruct in appropriate isolation precautions for identified infection/condition  Outcome: Progressing  Goal: Absence of fever/infection during neutropenic period  Description: INTERVENTIONS:  - Monitor WBC    Outcome: Progressing     Problem: SAFETY ADULT  Goal: Patient will remain free of falls  Description: INTERVENTIONS:  - Educate patient/family on patient safety including physical limitations  - Instruct patient to call for assistance with activity   - Consult OT/PT to assist with strengthening/mobility   - Keep Call bell within reach  - Keep bed low and locked with side rails adjusted as appropriate  - Keep care items and personal belongings within reach  - Initiate and maintain comfort rounds  - Make Fall Risk Sign visible to staff  - Offer Toileting every  Hours, in advance of need  - Initiate/Maintain alarm  - Obtain necessary fall risk management equipment:   - Apply yellow socks and bracelet for high fall risk patients  - Consider moving patient to room near nurses station  Outcome: Progressing  Goal: Maintain or return to baseline ADL function  Description: INTERVENTIONS:  -  Assess patient's ability to carry out ADLs; assess patient's baseline for ADL function and identify physical deficits which impact ability to perform ADLs (bathing, care of mouth/teeth, toileting, grooming, dressing, etc )  - Assess/evaluate cause of self-care deficits   - Assess range of motion  - Assess patient's mobility; develop plan if impaired  - Assess patient's need for assistive devices and provide as appropriate  - Encourage maximum independence but intervene and supervise when necessary  - Involve family in performance of ADLs  - Assess for home care needs following discharge   - Consider OT consult to assist with ADL evaluation and planning for discharge  - Provide patient education as appropriate  Outcome: Progressing  Goal: Maintains/Returns to pre admission functional level  Description: INTERVENTIONS:  - Perform BMAT or MOVE assessment daily    - Set and communicate daily mobility goal to care team and patient/family/caregiver  - Collaborate with rehabilitation services on mobility goals if consulted  - Perform Range of Motion  times a day  - Reposition patient every  hours    - Dangle patient  times a day  - Stand patient  times a day  - Ambulate patient  times a day  - Out of bed to chair  times a day   - Out of bed for meal times a day  - Out of bed for toileting  - Record patient progress and toleration of activity level   Outcome: Progressing     Problem: DISCHARGE PLANNING  Goal: Discharge to home or other facility with appropriate resources  Description: INTERVENTIONS:  - Identify barriers to discharge w/patient and caregiver  - Arrange for needed discharge resources and transportation as appropriate  - Identify discharge learning needs (meds, wound care, etc )  - Arrange for interpretive services to assist at discharge as needed  - Refer to Case Management Department for coordinating discharge planning if the patient needs post-hospital services based on physician/advanced practitioner order or complex needs related to functional status, cognitive ability, or social support system  Outcome: Progressing     Problem: Knowledge Deficit  Goal: Patient/family/caregiver demonstrates understanding of disease process, treatment plan, medications, and discharge instructions  Description: Complete learning assessment and assess knowledge base  Interventions:  - Provide teaching at level of understanding  - Provide teaching via preferred learning methods  Outcome: Progressing     Problem: Nutrition/Hydration-ADULT  Goal: Nutrient/Hydration intake appropriate for improving, restoring or maintaining nutritional needs  Description: Monitor and assess patient's nutrition/hydration status for malnutrition  Collaborate with interdisciplinary team and initiate plan and interventions as ordered  Monitor patient's weight and dietary intake as ordered or per policy  Utilize nutrition screening tool and intervene as necessary  Determine patient's food preferences and provide high-protein, high-caloric foods as appropriate       INTERVENTIONS:  - Monitor oral intake, urinary output, labs, and treatment plans  - Assess nutrition and hydration status and recommend course of action  - Evaluate amount of meals eaten  - Assist patient with eating if necessary   - Allow adequate time for meals  - Recommend/ encourage appropriate diets, oral nutritional supplements, and vitamin/mineral supplements  - Order, calculate, and assess calorie counts as needed  - Recommend, monitor, and adjust tube feedings and TPN/PPN based on assessed needs  - Assess need for intravenous fluids  - Provide specific nutrition/hydration education as appropriate  - Include patient/family/caregiver in decisions related to nutrition  Outcome: Progressing

## 2021-08-24 NOTE — NURSING NOTE
Patient discharged home  IV removed per protocol  AVS was reviewed in detail with patient and spouse  Patient agreed to follow up with Urology within one week for follow up  This nurse educated patient on signs and symptoms of infection and proper way to clean incision sites  Patient expressed understanding  All other questions and concerns were addressed at this time

## 2021-08-24 NOTE — PROGRESS NOTES
Patient bryant catheter removed at 21  per Urology order  Will begin voiding trail at this time  Patient also ambulated in the hallways for a total of 300 ft  Will continue to monitor

## 2021-08-24 NOTE — DISCHARGE SUMMARY
Discharge Summary - Ignacio Benitez  55 y o  male MRN: [id number]    Unit/Bed#: [place] 2 [facility] 87 227-01 Encounter: [id number]    Admission Date: 8/23/2021     Discharge Date: 08/24/21    HPI: Ignacio Benitez  is a 55 y o  male who presented for laparoscopic radical left nephrectomy by Dr Tiffanie Ramos with co-surgeon Dr Maria Del Rosario Mccarty and assisting Edu BAEZ PA-C  Procedure(s):  RADICAL NEPHRECTOMY  LAPAROSCOPIC W ROBOTICS  Surgeon(s):  MD Stephanie Oliva MD [place], [place]  8/23/2021    Hospital Course:  Patient presented to [place] [address] for scheduled procedure listed above with Dr Tiffanie Ramos on 8/23/21  He was taken to the OR where Dr Tiffanie Ramos and Dr Maria Del Rosario Mccarty performed the procedure without complication  Patient tolerated the procedure well  At the completion of the procedure, patient was extubated in the operating room and transferred to the PACU in stable condition  He was later transferred to the medical-surgical unit where he continued his recovery overnight without complication  This morning patient states his pain in his left upper quadrant is well controlled with pain medication  He reports appropriate mila-incisional tenderness  He notes abdominal pressure but denies bloating  Early this afternoon, patient had bowel movement after continued ambulation  He states that he has less abdominal pressure and that his abdominal pain has improved with bowel movement and ambulation  His Wallace catheter was draining clear yellow urine this morning  It was removed later this morning and patient has been voiding well on his own  He has been tolerating oral diet without difficulty  He has remained afebrile without leukocytosis  His vital signs have remained stable and his creatinine is 1 36  He is looking for to continue his recovery at home  Patient cleared from a urologic standpoint for discharge home today      Discharge Diagnosis: Left renal mass    Condition at Discharge: good     Discharge Medications:  See after visit summary for reconciled discharge medications provided to patient and family  Patient was discharged home on home medications with the addition of Norco and Senokot  Discharge instructions/Information to patient and family:   See after visit summary for information provided to patient and family  Provisions for Follow-Up Care:  See after visit summary for information related to follow-up care and any pertinent home health orders  Disposition: Home    Planned Readmission: No    Discharge Statement   I spent 15 minutes discharging the patient  This time was spent on the day of discharge  I had direct contact with the patient on the day of discharge  Additional documentation is required if more than 30 minutes were spent on discharge       Signature:   Solitario Denis  Date: 8/24/2021 Time: 1:56 PM

## 2021-08-24 NOTE — UTILIZATION REVIEW
Initial Clinical Review    ELECTIVE  OP PROCEDURE  CONVERTED TO IP  SURGERY    Elective    IP     surgical procedure    Age/Sex: 55 y o  male     Surgery Date:    8/23/21    Procedure: RADICAL NEPHRECTOMY  LAPAROSCOPIC W ROBOTICS (Left)    Anesthesia:    general    Operative Findings: Three arterial supply to the left kidney, 1 caudal to the renal vein, 2 cephalad  2  Complete mobilization of the kidney to be able to flip the lateral aspect medial in order to visualize the upper pole renal lesion  3  Intraoperative ultrasound of the borders of the renal mass which were defined  4  Intraoperative decision making given the inability to further mobilize the kidney due to the multiple points of fixation from the 3 arteries, renal vein and lower pole ureter as well as the extent of the upper pole mass and likely inability to perform a affective renorrhaphy, decision was made to convert to radical nephrectomy  5  200 cc of estimated blood loss    POD#1 Progress Note:   8/24    Continue post op care  Encourage ambulation  Continue pain control as  Needed  Wallace catheter intact,  Urine output  1150  Overnight, yellow urine  Tolerating diet  Continue to monitor labs, hemoglobin  12, creatinine  1 36  Admission Orders: Date/Time/Statement:   Admission Orders (From admission, onward)     Ordered        08/23/21 1402  Inpatient Admission  Once                   Orders Placed This Encounter   Procedures    Inpatient Admission     Standing Status:   Standing     Number of Occurrences:   1     Order Specific Question:   Level of Care     Answer:   Med Surg [16]     Order Specific Question:   Estimated length of stay     Answer:   More than 2 Midnights     Order Specific Question:   Certification     Answer:   I certify that inpatient services are medically necessary for this patient for a duration of greater than two midnights   See H&P and MD Progress Notes for additional information about the patient's course of treatment  Vital Signs: /85   Pulse 66   Temp 98 3 °F (36 8 °C)   Resp 20   SpO2 98%     Pertinent Labs/Diagnostic Test Results:       Results from last 7 days   Lab Units 08/24/21  0454   WBC Thousand/uL 9 09   HEMOGLOBIN g/dL 12 0   HEMATOCRIT % 37 5   PLATELETS Thousands/uL 143*         Results from last 7 days   Lab Units 08/24/21  0454   SODIUM mmol/L 140   POTASSIUM mmol/L 4 6   CHLORIDE mmol/L 105   CO2 mmol/L 25   ANION GAP mmol/L 10   BUN mg/dL 14   CREATININE mg/dL 1 36*   EGFR ml/min/1 73sq m 58   CALCIUM mg/dL 8 1*         Results from last 7 days   Lab Units 08/23/21  1154 08/23/21  0557   POC GLUCOSE mg/dl 174* 108     Results from last 7 days   Lab Units 08/24/21  0454   GLUCOSE RANDOM mg/dL 98                     Diet:    Surgical soft    Mobility:   OOB  As  tolerated    DVT Prophylaxis:    SCD's    Medications/Pain Control:   Scheduled Medications:  acetaminophen, 650 mg, Oral, Q6H DARREN  heparin (porcine), 5,000 Units, Subcutaneous, Q8H DARREN  senna, 1 tablet, Oral, Daily      Continuous IV Infusions:  lactated ringers, 50 mL/hr, Intravenous, Continuous      PRN Meds:  HYDROmorphone, 1 mg, Intravenous, Q3H PRN  ( x1  8/23)  lactated ringers, 1,000 mL, Intravenous, Once PRN   And  lactated ringers, 1,000 mL, Intravenous, Once PRN  ondansetron, 4 mg, Intravenous, Q6H PRN  ( x1  8/23)     oxyCODONE, 10 mg, Oral, Q4H PRN  oxyCODONE, 5 mg, Oral, Q4H PRN  simethicone, 80 mg, Oral, 4x Daily PRN  sodium chloride, 1,000 mL, Intravenous, Once PRN   And  sodium chloride, 1,000 mL, Intravenous, Once PRN        Network Utilization Review Department  ATTENTION: Please call with any questions or concerns to [phone] and carefully listen to the prompts so that you are directed to the right person   All voicemails are confidential   [place] Drop all requests for admission clinical reviews, approved or denied determinations and any other requests to dedicated fax number below belonging to the campus where the patient is receiving treatment   List of dedicated fax numbers for the Facilities:  [address] [place] [address] DENIALS (Administrative/Medical Necessity) [phone]   [address] (Maternity/NICU/Pediatrics) [phone]   [address] [place] [address] Dr Latrell Martinez [id number] [place] [address] Vandana Aguilar [place] [address] [place] [address] [phone]

## 2021-08-25 ENCOUNTER — TELEPHONE (OUTPATIENT)
Dept: UROLOGY | Facility: MEDICAL CENTER | Age: 56
End: 2021-08-25

## 2021-08-25 ENCOUNTER — TELEPHONE (OUTPATIENT)
Dept: OTHER | Facility: OTHER | Age: 56
End: 2021-08-25

## 2021-08-25 DIAGNOSIS — C64.9 MALIGNANT NEOPLASM OF KIDNEY, UNSPECIFIED LATERALITY (HCC): Primary | ICD-10-CM

## 2021-08-25 RX ORDER — ONDANSETRON 4 MG/1
4 TABLET, FILM COATED ORAL EVERY 8 HOURS PRN
Qty: 20 TABLET | Refills: 0 | Status: SHIPPED | OUTPATIENT
Start: 2021-08-25 | End: 2022-05-06

## 2021-08-25 RX ORDER — OXYCODONE HYDROCHLORIDE 5 MG/1
5 TABLET ORAL EVERY 8 HOURS PRN
Qty: 8 TABLET | Refills: 0 | Status: SHIPPED | OUTPATIENT
Start: 2021-08-25 | End: 2022-05-06

## 2021-08-25 RX ORDER — ACETAMINOPHEN 325 MG/1
650 TABLET ORAL EVERY 6 HOURS PRN
Qty: 30 TABLET | Refills: 0 | Status: SHIPPED | OUTPATIENT
Start: 2021-08-25 | End: 2022-05-06

## 2021-08-25 NOTE — TELEPHONE ENCOUNTER
Patient had surgery on 08/23/21 by Dr Mercedes Lundberg pharmacist called stating  They wanted to make the office aware patient just filled a prescription for [facility] yesterday   Is it ok to filled the oxy prescription

## 2021-08-25 NOTE — TELEPHONE ENCOUNTER
Looks like he tolerated the plain oxycodone inpatient better  Sent new rx  Can use plain tylenol as well   Sent zofran just in case too

## 2021-08-25 NOTE — TELEPHONE ENCOUNTER
Pt had a left nephrectomy on 8/23, d/c'd home yesterday  Took Norco for the first time last evening and woke up feeling nauseated today  Would like to know if there is something can be rx'd for nausea or if a different medicine can be rx'd  Denies any other complaints, no fever  Please f/u with pt; thank you! (1) Oriented to own ability

## 2021-08-25 NOTE — TELEPHONE ENCOUNTER
Called and spoke with patient  He is aware Charlotte Sidhu ordered Oxycodone and Zofran and that he may take Tylenol as well  He verbalized understanding

## 2021-08-26 NOTE — TELEPHONE ENCOUNTER
Called and spoke with [facility] DR PAULA TALLEY  They are aware to proceed with the Norco that has already been filled for patient

## 2021-08-31 ENCOUNTER — TELEPHONE (OUTPATIENT)
Dept: UROLOGY | Facility: MEDICAL CENTER | Age: 56
End: 2021-08-31

## 2021-09-09 ENCOUNTER — OFFICE VISIT (OUTPATIENT)
Dept: UROLOGY | Facility: CLINIC | Age: 56
End: 2021-09-09

## 2021-09-09 VITALS
RESPIRATION RATE: 20 BRPM | BODY MASS INDEX: 29.92 KG/M2 | DIASTOLIC BLOOD PRESSURE: 100 MMHG | WEIGHT: 202 LBS | HEART RATE: 68 BPM | HEIGHT: 69 IN | SYSTOLIC BLOOD PRESSURE: 170 MMHG

## 2021-09-09 DIAGNOSIS — N28.89 LEFT RENAL MASS: Primary | ICD-10-CM

## 2021-09-09 PROCEDURE — 99024 POSTOP FOLLOW-UP VISIT: CPT | Performed by: UROLOGY

## 2021-09-09 NOTE — PROGRESS NOTES
UROLOGY POSTOPERATIVE NOTE     CHIEF COMPLAINT   Brandie Jacobson  is a 55 y o  male with a complaint of   Chief Complaint   Patient presents with    Renal Cancer    Post op check       History of Present Illness:     55 y o  male  Seen in the emergency room and diagnosed with COVID-19 in early May  Patient has had multiple family members who have had the disease and 2 more remote family members who have passed away  He is out of his core team period and only having some mild symptoms  Patient has right and left-sided back pain  He works in the [employer] male  He is a nonsmoker and has no family history of kidney cancer  Incidental left upper pole renal mass was noted at the time of his ER imaging  Patient was scheduled for a partial nephrectomy robotically  Intraoperatively, the patient was noted to have 3 hilar vessels  We were able to mobilize the complete kidney but given the superior position and the fixation from the hilar vessels and ureter, we were unable to complete the partial   This was converted to radical nephrectomy which was uncomplicated      Past Medical History:     Past Medical History:   Diagnosis Date    Cancer [facility] [address]     Diabetes mellitus ([facility] [address] )     TYPE 2-borderline   diet controlled    Left kidney mass     L partial nephrectomy today 8/23/2021       PAST SURGICAL HISTORY:     Past Surgical History:   Procedure Laterality Date    COLONOSCOPY      LA LAP,PARTIAL NEPHRECTOMY Left 8/23/2021    Procedure: RADICAL NEPHRECTOMY  LAPAROSCOPIC W ROBOTICS;  Surgeon: Joshua Coyle MD;  Location: [facility];  Service: Urology    TONSILLECTOMY         CURRENT MEDICATIONS:     Current Outpatient Medications   Medication Sig Dispense Refill    senna (SENOKOT) 8 6 MG tablet Take 1 tablet (8 6 mg total) by mouth daily 14 tablet 0    acetaminophen (TYLENOL) 325 mg tablet Take 2 tablets (650 mg total) by mouth every 6 (six) hours as needed for mild pain or moderate pain (Patient not taking: Reported on 9/9/2021) 30 tablet 0    ondansetron (ZOFRAN) 4 mg tablet Take 1 tablet (4 mg total) by mouth every 8 (eight) hours as needed for nausea or vomiting (Patient not taking: Reported on 9/9/2021) 20 tablet 0    oxyCODONE (ROXICODONE) 5 mg immediate release tablet Take 1 tablet (5 mg total) by mouth every 8 (eight) hours as needed for severe pain (breakthrough pain only)Max Daily Amount: 15 mg 8 tablet 0     No current facility-administered medications for this visit  ALLERGIES:   No Known Allergies    SOCIAL HISTORY:     Social History     Socioeconomic History    Marital status: /Civil Union     Spouse name: None    Number of children: None    Years of education: None    Highest education level: None   Occupational History    None   Tobacco Use    Smoking status: Never Smoker    Smokeless tobacco: Never Used   Vaping Use    Vaping Use: Never used   Substance and Sexual Activity    Alcohol use: Not Currently    Drug use: Never    Sexual activity: Yes   Other Topics Concern    None   Social History Narrative    None     Social Determinants of Health     Financial Resource Strain:     Difficulty of Paying Living Expenses:    Food Insecurity:     Worried About Running Out of Food in the Last Year:     Ran Out of Food in the Last Year:    Transportation Needs:     Lack of Transportation (Medical):      Lack of Transportation (Non-Medical):    Physical Activity:     Days of Exercise per Week:     Minutes of Exercise per Session:    Stress:     Feeling of Stress :    Social Connections:     Frequency of Communication with Friends and Family:     Frequency of Social Gatherings with Friends and Family:     Attends [employer] Services:     Active Member of Clubs or Organizations:     Attends Club or Organization Meetings:     Marital Status:    Intimate Partner Violence:     Fear of Current or Ex-Partner:     Emotionally Abused:     Physically Abused:     Sexually Abused:        SOCIAL HISTORY:   History reviewed  No pertinent family history  REVIEW OF SYSTEMS:     Review of Systems   Constitutional: Negative  Respiratory: Positive for cough and shortness of breath  Cardiovascular: Negative  Gastrointestinal: Negative  Genitourinary: Negative  Musculoskeletal: Positive for back pain and myalgias  Skin: Negative  Psychiatric/Behavioral: Negative  PHYSICAL EXAM:     /100   Pulse 68   Resp 20   Ht 5' 9" (1 753 m)   Wt 91 6 kg (202 lb)   BMI 29 83 kg/m²     General:  Healthy appearing male in no acute distress  They have a normal affect  There is not appear to be any gross neurologic defects or abnormalities  HEENT:  Normocephalic, atraumatic  Neck is supple without any palpable lymphadenopathy  Cardiovascular:  Patient has normal palpable distal radial pulses  There is no significant peripheral edema  No JVD is noted  Respiratory:  Patient has unlabored respirations  There is no audible wheeze or rhonchi  Abdomen:  Abdomen is soft and nontender  Healing laparoscopic scars  There is no tympany  Inguinal and umbilical hernia are not appreciated  Musculoskeletal:  Patient does not have significant CVA tenderness in the  flank with palpation or percussion  They full range of motion in all 4 extremities  Strength in all 4 extremities appears congruent  Patient is able to ambulate without assistance or difficulty  Dermatologic:  Patient has no skin abnormalities or rashes        LABS:     CBC:   Lab Results   Component Value Date    WBC 9 09 2021    HGB 12 0 2021    HCT 37 5 2021    MCV 91 2021     (L) 2021       BMP:   Lab Results   Component Value Date    CALCIUM 8 1 (L) 2021    K 4 6 2021    CO2 25 2021     2021    BUN 14 2021    CREATININE 1 36 (H) 2021       IMAGIN/10/21  CT ABDOMEN AND PELVIS WITH IV CONTRAST     INDICATION: Abdominal pain, acute, nonlocalized  Left-sided abdominal pain      COMPARISON:  None      TECHNIQUE:  CT examination of the abdomen and pelvis was performed  Axial, sagittal, and coronal 2D reformatted images were created from the source data and submitted for interpretation      Radiation dose length product (DLP) for this visit:  1004 mGy-cm   This examination, like all CT scans performed in the [facility], was performed utilizing techniques to minimize radiation dose exposure, including the use of iterative   reconstruction and automated exposure control      IV Contrast:  100 mL of iohexol (OMNIPAQUE)  Enteric Contrast:  Enteric contrast was not administered      FINDINGS:     ABDOMEN     LOWER CHEST: Small groundglass nodular densities are seen at the left lung base and right lung base LIVER/BILIARY TREE:  Unremarkable      GALLBLADDER:  No calcified gallstones  No pericholecystic inflammatory change      SPLEEN:  Unremarkable      PANCREAS:  Unremarkable      ADRENAL GLANDS:  Unremarkable      KIDNEYS/URETERS:  There is a solid mass in the upper pole of the left kidney which measures 3 6 x 3 7 x 3 4 cm  This mass is more than 50% to exophytic  This mass doesn't cross the polar line, broaches the renal hilum  Patent left renal vein, IVC  STOMACH AND BOWEL:  Diverticulosis seen, without evidence of diverticulitis     APPENDIX:  No findings to suggest appendicitis      ABDOMINOPELVIC CAVITY:  No ascites  No pneumoperitoneum    No lymphadenopathy      VESSELS:  Celiac trunk, SMA are patent     PELVIS     REPRODUCTIVE ORGANS:  Unremarkable for patient's age      URINARY BLADDER:  Unremarkable      ABDOMINAL WALL/INGUINAL REGIONS:  Unremarkable      OSSEOUS STRUCTURES:  No gross lytic lesion seen  Chronic Schmorl's nodes seen along the inferior aspect of the L4 vertebra     IMPRESSION:     No acute inflammatory stranding  Diverticulosis  3 6 x 3 7 x 2 4 cm solid renal mass upper pole left kidney, likely neoplasm  No retroperitoneal lymph node enlargement seen  No renal vein invasion  Urology evaluation suggested  Nodular areas of groundglass density seen at the left lung base, right lung base typical for Covid associated infiltrate  Correlate clinically to exclude other etiologies     Follow-up chest CT at 3 months to demonstrate resolution        PATHOLOGY:     8/23/21  Final Diagnosis   A  Soft Tissue, Other, fat overlying tumor:  -Benign mature adipose tissue with mild hemorrhage  -No evidence of malignancy seen     B  Kidney, Left, nephrectomy:  -Clear cell renal cell carcinoma,  2 9  x 2 9  x 3 3 cm, confined to the kidney  -Vascular and ureteral margin are negative for malignancy   -Background kidney with no pathological changes  -Adrenal gland is not submitted   -No lymph node identified     ASSESSMENT:     55 y o  male with pT1a ccRCC now s/p robotic nephrectomy    PLAN:       I reviewed the patient's pathology with both he and his wife  This is very reassuring  Given the conversion to radical nephrectomy, this contained low stage renal cell carcinoma will likely be cured by this operation  Routine surveillance is recommended  Will perform additional imaging at 3 months and then likely at 1 year  Patient can return to work after early October with no restrictions  We discussed activity at home as he recovers  We discussed avoidance of nonsteroidal anti-inflammatories given the solitary kidney  He will contact me with any questions or concerns but is doing very well in the immediate postoperative period

## 2021-11-22 ENCOUNTER — HOSPITAL ENCOUNTER (OUTPATIENT)
Dept: CT IMAGING | Facility: HOSPITAL | Age: 56
Discharge: HOME | End: 2021-11-22
Attending: UROLOGY
Payer: COMMERCIAL

## 2021-11-22 DIAGNOSIS — N28.89 LEFT RENAL MASS: ICD-10-CM

## 2021-11-22 PROCEDURE — 74178 CT ABD&PLV WO CNTR FLWD CNTR: CPT

## 2021-11-22 PROCEDURE — G1004 CDSM NDSC: HCPCS

## 2021-11-22 RX ADMIN — IODIXANOL 100 ML: 320 INJECTION, SOLUTION INTRAVASCULAR at 10:13

## 2021-12-07 ENCOUNTER — OFFICE VISIT (OUTPATIENT)
Dept: UROLOGY | Facility: CLINIC | Age: 56
End: 2021-12-07
Payer: COMMERCIAL

## 2021-12-07 VITALS
HEART RATE: 58 BPM | HEIGHT: 69 IN | BODY MASS INDEX: 30.78 KG/M2 | DIASTOLIC BLOOD PRESSURE: 88 MMHG | WEIGHT: 207.8 LBS | SYSTOLIC BLOOD PRESSURE: 118 MMHG

## 2021-12-07 DIAGNOSIS — E11.9 TYPE 2 DIABETES MELLITUS WITHOUT COMPLICATION, WITHOUT LONG-TERM CURRENT USE OF INSULIN (HCC): ICD-10-CM

## 2021-12-07 DIAGNOSIS — N28.89 LEFT RENAL MASS: Primary | ICD-10-CM

## 2021-12-07 PROCEDURE — 99213 OFFICE O/P EST LOW 20 MIN: CPT | Performed by: UROLOGY

## 2021-12-15 ENCOUNTER — HOSPITAL ENCOUNTER (OUTPATIENT)
Dept: RADIOLOGY | Facility: HOSPITAL | Age: 56
Discharge: HOME | End: 2021-12-15
Attending: UROLOGY
Payer: COMMERCIAL

## 2021-12-15 ENCOUNTER — APPOINTMENT (OUTPATIENT)
Dept: LAB | Facility: HOSPITAL | Age: 56
End: 2021-12-15
Attending: UROLOGY
Payer: COMMERCIAL

## 2021-12-15 DIAGNOSIS — E11.9 TYPE 2 DIABETES MELLITUS WITHOUT COMPLICATION, WITHOUT LONG-TERM CURRENT USE OF INSULIN (HCC): ICD-10-CM

## 2021-12-15 DIAGNOSIS — N28.89 LEFT RENAL MASS: ICD-10-CM

## 2021-12-15 LAB
ANION GAP SERPL CALCULATED.3IONS-SCNC: 5 MMOL/L (ref 4–13)
BUN SERPL-MCNC: 25 MG/DL (ref 5–25)
CALCIUM SERPL-MCNC: 9.1 MG/DL (ref 8.3–10.1)
CHLORIDE SERPL-SCNC: 106 MMOL/L (ref 100–108)
CO2 SERPL-SCNC: 30 MMOL/L (ref 21–32)
CREAT SERPL-MCNC: 1.44 MG/DL (ref 0.6–1.3)
GFR SERPL CREATININE-BSD FRML MDRD: 54 ML/MIN/1.73SQ M
GLUCOSE P FAST SERPL-MCNC: 103 MG/DL (ref 65–99)
POTASSIUM SERPL-SCNC: 4.7 MMOL/L (ref 3.5–5.3)
PSA SERPL-MCNC: 0.2 NG/ML (ref 0–4)
SODIUM SERPL-SCNC: 141 MMOL/L (ref 136–145)

## 2021-12-15 PROCEDURE — 71046 X-RAY EXAM CHEST 2 VIEWS: CPT

## 2021-12-15 PROCEDURE — G0103 PSA SCREENING: HCPCS

## 2021-12-15 PROCEDURE — 36415 COLL VENOUS BLD VENIPUNCTURE: CPT

## 2021-12-15 PROCEDURE — 80048 BASIC METABOLIC PNL TOTAL CA: CPT

## 2022-05-09 ENCOUNTER — HOSPITAL ENCOUNTER (OUTPATIENT)
Dept: GASTROENTEROLOGY | Facility: HOSPITAL | Age: 57
Setting detail: OUTPATIENT SURGERY
Discharge: HOME | End: 2022-05-09
Attending: INTERNAL MEDICINE
Payer: COMMERCIAL

## 2022-05-09 ENCOUNTER — ANESTHESIA (OUTPATIENT)
Dept: GASTROENTEROLOGY | Facility: HOSPITAL | Age: 57
End: 2022-05-09

## 2022-05-09 ENCOUNTER — ANESTHESIA EVENT (OUTPATIENT)
Dept: GASTROENTEROLOGY | Facility: HOSPITAL | Age: 57
End: 2022-05-09

## 2022-05-09 VITALS
DIASTOLIC BLOOD PRESSURE: 80 MMHG | TEMPERATURE: 97.8 F | HEIGHT: 69 IN | OXYGEN SATURATION: 99 % | RESPIRATION RATE: 18 BRPM | WEIGHT: 208 LBS | HEART RATE: 71 BPM | BODY MASS INDEX: 30.81 KG/M2 | SYSTOLIC BLOOD PRESSURE: 135 MMHG

## 2022-05-09 DIAGNOSIS — R19.5 OCCULT BLOOD POSITIVE STOOL: ICD-10-CM

## 2022-05-09 LAB — GLUCOSE SERPL-MCNC: 94 MG/DL (ref 65–140)

## 2022-05-09 PROCEDURE — 88305 TISSUE EXAM BY PATHOLOGIST: CPT | Performed by: PATHOLOGY

## 2022-05-09 PROCEDURE — 82948 REAGENT STRIP/BLOOD GLUCOSE: CPT

## 2022-05-09 RX ORDER — LIDOCAINE HYDROCHLORIDE 10 MG/ML
INJECTION, SOLUTION EPIDURAL; INFILTRATION; INTRACAUDAL; PERINEURAL AS NEEDED
Status: DISCONTINUED | OUTPATIENT
Start: 2022-05-09 | End: 2022-05-09

## 2022-05-09 RX ORDER — PROPOFOL 10 MG/ML
INJECTION, EMULSION INTRAVENOUS AS NEEDED
Status: DISCONTINUED | OUTPATIENT
Start: 2022-05-09 | End: 2022-05-09

## 2022-05-09 RX ORDER — SODIUM CHLORIDE, SODIUM LACTATE, POTASSIUM CHLORIDE, CALCIUM CHLORIDE 600; 310; 30; 20 MG/100ML; MG/100ML; MG/100ML; MG/100ML
125 INJECTION, SOLUTION INTRAVENOUS CONTINUOUS
Status: DISCONTINUED | OUTPATIENT
Start: 2022-05-09 | End: 2022-05-13 | Stop reason: HOSPADM

## 2022-05-09 RX ORDER — LIDOCAINE HYDROCHLORIDE 10 MG/ML
0.5 INJECTION, SOLUTION EPIDURAL; INFILTRATION; INTRACAUDAL; PERINEURAL ONCE AS NEEDED
Status: DISCONTINUED | OUTPATIENT
Start: 2022-05-09 | End: 2022-05-13 | Stop reason: HOSPADM

## 2022-05-09 RX ORDER — DIPHENHYDRAMINE HYDROCHLORIDE 50 MG/ML
12.5 INJECTION INTRAMUSCULAR; INTRAVENOUS ONCE AS NEEDED
Status: CANCELLED | OUTPATIENT
Start: 2022-05-09

## 2022-05-09 RX ORDER — PROPOFOL 10 MG/ML
INJECTION, EMULSION INTRAVENOUS CONTINUOUS PRN
Status: DISCONTINUED | OUTPATIENT
Start: 2022-05-09 | End: 2022-05-09

## 2022-05-09 RX ORDER — METOCLOPRAMIDE HYDROCHLORIDE 5 MG/ML
10 INJECTION INTRAMUSCULAR; INTRAVENOUS ONCE AS NEEDED
Status: CANCELLED | OUTPATIENT
Start: 2022-05-09

## 2022-05-09 RX ORDER — ONDANSETRON 2 MG/ML
4 INJECTION INTRAMUSCULAR; INTRAVENOUS ONCE AS NEEDED
Status: CANCELLED | OUTPATIENT
Start: 2022-05-09

## 2022-05-09 RX ADMIN — PROPOFOL 50 MG: 10 INJECTION, EMULSION INTRAVENOUS at 11:08

## 2022-05-09 RX ADMIN — PROPOFOL 100 MG: 10 INJECTION, EMULSION INTRAVENOUS at 10:51

## 2022-05-09 RX ADMIN — PROPOFOL 150 MCG/KG/MIN: 10 INJECTION, EMULSION INTRAVENOUS at 10:51

## 2022-05-09 RX ADMIN — PROPOFOL 30 MG: 10 INJECTION, EMULSION INTRAVENOUS at 11:02

## 2022-05-09 RX ADMIN — LIDOCAINE HYDROCHLORIDE 50 MG: 10 INJECTION, SOLUTION EPIDURAL; INFILTRATION; INTRACAUDAL; PERINEURAL at 10:51

## 2022-05-09 RX ADMIN — SODIUM CHLORIDE, SODIUM LACTATE, POTASSIUM CHLORIDE, AND CALCIUM CHLORIDE: .6; .31; .03; .02 INJECTION, SOLUTION INTRAVENOUS at 10:39

## 2022-05-09 RX ADMIN — PROPOFOL 40 MG: 10 INJECTION, EMULSION INTRAVENOUS at 11:12

## 2022-05-09 RX ADMIN — PROPOFOL 40 MG: 10 INJECTION, EMULSION INTRAVENOUS at 11:00

## 2022-05-09 RX ADMIN — PROPOFOL 50 MG: 10 INJECTION, EMULSION INTRAVENOUS at 10:56

## 2022-05-09 NOTE — H&P
History and Physical - [facility] at [facility]  55 y o  male MRN: [id number]                  HPI: Annabella Jasso  is a 55y o  year old male who presents for colonoscopy for colon cancer screening and positive FOBT  Last colonoscopy greater than 10 years ago  REVIEW OF SYSTEMS: Per the HPI, and otherwise unremarkable  Historical Information   Past Medical History:   Diagnosis Date    [facility] [address]     Diabetes mellitus [facility] [address] [facility]     TYPE 2-borderline   diet controlled    History of COVID-19 05/10/2021    Mild Symptoms    Left kidney mass     L partial nephrectomy today 8/23/2021     Past Surgical History:   Procedure Laterality Date    COLONOSCOPY      [place] LAP,PARTIAL NEPHRECTOMY Left 8/23/2021    Procedure: RADICAL NEPHRECTOMY  LAPAROSCOPIC W ROBOTICS;  Surgeon: Maria Isabel Licona MD;  Location: [facility];  Service: Urology    TONSILLECTOMY       Social History   Social History     Substance and Sexual Activity   Alcohol Use Not Currently     Social History     Substance and Sexual Activity   Drug Use Never     Social History     Tobacco Use   Smoking Status Never Smoker   Smokeless Tobacco Never Used     History reviewed  No pertinent family history  Meds/Allergies     (Not in a hospital admission)      No Known Allergies    Objective     Blood pressure 143/91, pulse 66, temperature 98 1 °F (36 7 °C), temperature source Oral, resp  rate 18, height 5' 9" (1 753 m), weight 94 3 kg (208 lb), SpO2 100 %  PHYSICAL EXAM    Gen: NAD  CV: RRR  CHEST: Clear  ABD: soft, NT/ND  EXT: no edema      ASSESSMENT/PLAN:  This is a 55y o  year old male here for colonoscopy for colon cancer screening and positive FOBT  Patient is stable and optimized for procedure      PLAN:   Procedure:  Colonoscopy

## 2022-05-09 NOTE — ANESTHESIA POSTPROCEDURE EVALUATION
Post-Op Assessment Note    CV Status:  Stable    Pain management: adequate     Mental Status:  Alert and awake   Hydration Status:  Euvolemic   PONV Controlled:  Controlled   Airway Patency:  Patent      Post Op Vitals Reviewed: Yes      Staff: CRNA         No complications documented      BP   111/78   Temp      Pulse  91   Resp   18   SpO2   98%

## 2022-05-09 NOTE — ANESTHESIA PREPROCEDURE EVALUATION
Procedure:  COLONOSCOPY    Relevant Problems   ANESTHESIA (within normal limits)      CARDIO (within normal limits)      ENDO   (+) Type 2 diabetes mellitus without complication, without long-term current use of insulin (HCC)      GI/HEPATIC (within normal limits)      /RENAL (within normal limits)      HEMATOLOGY (within normal limits)      MUSCULOSKELETAL (within normal limits)      NEURO/PSYCH (within normal limits)      PULMONARY (within normal limits)        Physical Exam    Airway    Mallampati score: II  TM Distance: >3 FB  Neck ROM: full     Dental   No notable dental hx     Cardiovascular  Rhythm: regular, Rate: normal, Cardiovascular exam normal    Pulmonary  Pulmonary exam normal Breath sounds clear to auscultation,     Other Findings        Anesthesia Plan  ASA Score- 2     Anesthesia Type- IV sedation with anesthesia with ASA Monitors  Additional Monitors:   Airway Plan:           Plan Factors-    Chart reviewed  Existing labs reviewed  Patient summary reviewed  Induction- intravenous  Postoperative Plan-     Informed Consent- Anesthetic plan and risks discussed with patient  I personally reviewed this patient with the CRNA  Discussed and agreed on the Anesthesia Plan with the CRNA  Jorge Luis Montgomery Recent labs personally reviewed:  Lab Results   Component Value Date    WBC 9 09 08/24/2021    HGB 12 0 08/24/2021     (L) 08/24/2021     Lab Results   Component Value Date    K 4 7 12/15/2021    BUN 25 12/15/2021    CREATININE 1 44 (H) 12/15/2021     Lab Results   Component Value Date    PTT 27 08/11/2021      Lab Results   Component Value Date    INR 0 93 08/11/2021       Blood type A    Lab Results   Component Value Date    HGBA1C 6 2 (H) 02/14/2022       I, Radha Trejo MD, have personally seen and evaluated the patient prior to anesthetic care  I have reviewed the pre-anesthetic record, and other medical records if appropriate to the anesthetic care    If a CRNA is involved in the case, I have reviewed the CRNA assessment, if present, and agree  Risks/benefits and alternatives discussed with patient including possible PONV, sore throat, and possibility of rare anesthetic and surgical emergencies

## 2022-08-09 ENCOUNTER — TELEPHONE (OUTPATIENT)
Dept: OTHER | Facility: OTHER | Age: 57
End: 2022-08-09

## 2022-08-09 DIAGNOSIS — N28.89 LEFT RENAL MASS: Primary | ICD-10-CM

## 2022-08-09 NOTE — TELEPHONE ENCOUNTER
Patient needs orders for Creatine and GFR labs to be completed before his CT abdomen pelvis w wo contrast on 08-10-99

## 2022-08-09 NOTE — TELEPHONE ENCOUNTER
Called and left another voicemail message for patient and a voicemail for his spouse Jermaine Running as well for patient to please have BMP completed at the lab in order to proceed with CT scan as scheduled for tomorrow  Asked them to please call the office back to confirm

## 2022-08-09 NOTE — TELEPHONE ENCOUNTER
Order for BMP placed in patients chart  Called and left voicemail message that patient needs blood work prior to CT scan tomorrow and that orders were placed for him to walk in to any [facility] and have blood drawn  Asked patient to please contact the office to confirm he received voicemail and is able to get blood work done prior to CT scan

## 2022-08-10 ENCOUNTER — APPOINTMENT (OUTPATIENT)
Dept: LAB | Facility: HOSPITAL | Age: 57
End: 2022-08-10
Attending: UROLOGY
Payer: COMMERCIAL

## 2022-08-10 ENCOUNTER — HOSPITAL ENCOUNTER (OUTPATIENT)
Dept: CT IMAGING | Facility: HOSPITAL | Age: 57
Discharge: HOME | End: 2022-08-10
Attending: UROLOGY
Payer: COMMERCIAL

## 2022-08-10 DIAGNOSIS — N28.89 LEFT RENAL MASS: ICD-10-CM

## 2022-08-10 DIAGNOSIS — E11.9 TYPE 2 DIABETES MELLITUS WITHOUT COMPLICATION, WITHOUT LONG-TERM CURRENT USE OF INSULIN (HCC): ICD-10-CM

## 2022-08-10 LAB
ANION GAP SERPL CALCULATED.3IONS-SCNC: 7 MMOL/L (ref 4–13)
BUN SERPL-MCNC: 19 MG/DL (ref 5–25)
CALCIUM SERPL-MCNC: 9.1 MG/DL (ref 8.3–10.1)
CHLORIDE SERPL-SCNC: 100 MMOL/L (ref 96–108)
CO2 SERPL-SCNC: 31 MMOL/L (ref 21–32)
CREAT SERPL-MCNC: 1.62 MG/DL (ref 0.6–1.3)
GFR SERPL CREATININE-BSD FRML MDRD: 47 ML/MIN/1.73SQ M
GLUCOSE P FAST SERPL-MCNC: 116 MG/DL (ref 65–99)
POTASSIUM SERPL-SCNC: 3.9 MMOL/L (ref 3.5–5.3)
SODIUM SERPL-SCNC: 138 MMOL/L (ref 135–147)

## 2022-08-10 PROCEDURE — G1004 CDSM NDSC: HCPCS

## 2022-08-10 PROCEDURE — 80048 BASIC METABOLIC PNL TOTAL CA: CPT

## 2022-08-10 PROCEDURE — 74178 CT ABD&PLV WO CNTR FLWD CNTR: CPT

## 2022-08-10 PROCEDURE — 71270 CT THORAX DX C-/C+: CPT

## 2022-08-10 PROCEDURE — 36415 COLL VENOUS BLD VENIPUNCTURE: CPT

## 2022-08-10 RX ADMIN — IOHEXOL 75 ML: 350 INJECTION, SOLUTION INTRAVENOUS at 09:36

## 2022-08-30 ENCOUNTER — OFFICE VISIT (OUTPATIENT)
Dept: UROLOGY | Facility: CLINIC | Age: 57
End: 2022-08-30
Payer: COMMERCIAL

## 2022-08-30 VITALS
DIASTOLIC BLOOD PRESSURE: 80 MMHG | SYSTOLIC BLOOD PRESSURE: 124 MMHG | WEIGHT: 201 LBS | BODY MASS INDEX: 29.77 KG/M2 | HEIGHT: 69 IN

## 2022-08-30 DIAGNOSIS — N28.89 LEFT RENAL MASS: Primary | ICD-10-CM

## 2022-08-30 PROCEDURE — 99214 OFFICE O/P EST MOD 30 MIN: CPT | Performed by: UROLOGY

## 2022-08-30 RX ORDER — PANTOPRAZOLE SODIUM 20 MG/1
TABLET, DELAYED RELEASE ORAL
COMMUNITY
Start: 2022-08-10

## 2022-08-30 NOTE — PROGRESS NOTES
UROLOGY FOLLOW-UP NOTE     CHIEF COMPLAINT   Yeimi Glasgow  is a 65 y o  male with a complaint of   Chief Complaint   Patient presents with    Follow-up       History of Present Illness:     65 y o  male seen in the emergency room and diagnosed with COVID-19 in early May  Patient has had multiple family members who have had the disease and 2 more remote family members who have passed away  He is out of his core team period and only having some mild symptoms  Patient has right and left-sided back pain  He works in the [employer]  He is a nonsmoker and has no family history of kidney cancer  Incidental left upper pole renal mass was noted at the time of his ER imaging  Patient was scheduled for a partial nephrectomy robotically  Intraoperatively, the patient was noted to have 3 hilar vessels  We were able to mobilize the complete kidney but given the superior position and the fixation from the hilar vessels and ureter, we were unable to complete the partial   This was converted to radical nephrectomy which was uncomplicated  Patient now 1 year after robotic partial converted to radical nephrectomy given hilar    Past Medical History:     Past Medical History:   Diagnosis Date    Cancer ([facility] [address]     Diabetes mellitus ([facility] [address] )     TYPE 2-borderline   diet controlled    History of COVID-19 05/10/2021    Mild Symptoms    Left kidney mass     L partial nephrectomy today 8/23/2021       PAST SURGICAL HISTORY:     Past Surgical History:   Procedure Laterality Date    COLONOSCOPY      MS LAP,PARTIAL NEPHRECTOMY Left 8/23/2021    Procedure: RADICAL NEPHRECTOMY  LAPAROSCOPIC W ROBOTICS;  Surgeon: Amara Mercedes MD;  Location: [facility] OR;  Service: Urology    TONSILLECTOMY         CURRENT MEDICATIONS:     Current Outpatient Medications   Medication Sig Dispense Refill    pantoprazole (PROTONIX) 20 mg tablet        No current facility-administered medications for this visit         ALLERGIES:   No Known Allergies    SOCIAL HISTORY:     Social History     Socioeconomic History    Marital status: /Civil Union     Spouse name: None    Number of children: None    Years of education: None    Highest education level: None   Occupational History    None   Tobacco Use    Smoking status: Never Smoker    Smokeless tobacco: Never Used   Vaping Use    Vaping Use: Never used   Substance and Sexual Activity    Alcohol use: Not Currently    Drug use: Never    Sexual activity: Yes   Other Topics Concern    None   Social History Narrative    None     Social Determinants of Health     Financial Resource Strain: Not on file   Food Insecurity: Not on file   Transportation Needs: Not on file   Physical Activity: Not on file   Stress: Not on file   Social Connections: Not on file   Intimate Partner Violence: Not on file   Housing Stability: Not on file       SOCIAL HISTORY:   History reviewed  No pertinent family history  REVIEW OF SYSTEMS:     Review of Systems   Constitutional: Negative  Respiratory: Negative  Cardiovascular: Negative  Gastrointestinal: Negative  Genitourinary: Negative  Musculoskeletal: Negative  Negative for back pain and myalgias  Skin: Negative  Psychiatric/Behavioral: Negative  PHYSICAL EXAM:     /80   Ht 5' 9" (1 753 m)   Wt 91 2 kg (201 lb)   BMI 29 68 kg/m²     General:  Healthy appearing male in no acute distress  They have a normal affect  There is not appear to be any gross neurologic defects or abnormalities  HEENT:  Normocephalic, atraumatic  Neck is supple without any palpable lymphadenopathy  Cardiovascular:  Patient has normal palpable distal radial pulses  There is no significant peripheral edema  No JVD is noted  Respiratory:  Patient has unlabored respirations  There is no audible wheeze or rhonchi  Abdomen:  Abdomen is soft and nontender  Well-healed laparoscopic scars  There is no tympany    Inguinal and umbilical hernia are not appreciated  Right inguinal hernia scar healed  :  Circumcised phallus, orthotopic meatus, testicles are smooth and descended, rectal exam is a smooth prostate  Musculoskeletal:  Patient does not have significant CVA tenderness in the  flank with palpation or percussion  They full range of motion in all 4 extremities  Strength in all 4 extremities appears congruent  Patient is able to ambulate without assistance or difficulty  Dermatologic:  Patient has no skin abnormalities or rashes  LABS:     CBC:   Lab Results   Component Value Date    WBC 9 09 2021    HGB 12 0 2021    HCT 37 5 2021    MCV 91 2021     (L) 2021       BMP:   Lab Results   Component Value Date    CALCIUM 9 1 08/10/2022    K 3 9 08/10/2022    CO2 31 08/10/2022     08/10/2022    BUN 19 08/10/2022    CREATININE 1 62 (H) 08/10/2022       IMAGIN/10/22  CT CHEST, ABDOMEN AND PELVIS WITH AND WITHOUT IV CONTRAST     INDICATION:   N28 89: Other specified disorders of kidney and ureter  E11 9: Type 2 diabetes mellitus without complications      COMPARISON:  Previous study from 2021     TECHNIQUE: Initial CT of the abdomen and pelvis was performed without intravenous contrast   Subsequent dynamic CT evaluation of the chest, abdomen and pelvis was performed after the administration of intravenous contrast was performed  Finally, delayed   dynamic delayed phase postcontrast CT evaluation of the abdomen and pelvis was performed  Axial, sagittal, and coronal 2D reformatted images were created from the source data and submitted for interpretation       Radiation dose length product (DLP) for this visit:  4753 mGy-cm     This examination, like all CT scans performed in the [facility], was performed utilizing techniques to minimize radiation dose exposure, including the use of iterative   reconstruction and automated exposure control      IV Contrast:  75 mL of iohexol (OMNIPAQUE)  Enteric Contrast:  Enteric contrast was not administered      FINDINGS:     LUNGS:  Left upper lobe lung nodule seen measuring about 3 mm, image 46 series 7  Additional 3 mm nodule seen left upper lobe in image 59 series 7  A left lower lobe lung nodule seen, measuring about 3 mm in image #67  Trachea and central bronchi are patent     PLEURA:  Unremarkable      HEART/GREAT VESSELS: Heart is unremarkable for patient's age  No thoracic aortic aneurysm      MEDIASTINUM AND CHARLIE:  No significant mediastinal lymph node enlargement seen  Lower right paratracheal, left paratracheal and subcarinal lymph nodes do not meet the criteria for pathologic enlargement on the bases of size     CHEST WALL AND LOWER NECK:  Unremarkable      ABDOMEN     RIGHT KIDNEY AND URETER:  No solid renal mass  No detectable urothelial mass  No hydronephrosis or hydroureter  No urinary tract calculi  No perinephric collection      LEFT KIDNEY AND URETER:  Left kidney is surgically absent  There is infiltration seen in the left renal fossa with the mixed attenuation due to postsurgical changes, stable  There is no mass seen left renal     URINARY BLADDER:  No bladder wall mass  No calculi         LIVER/BILIARY TREE:  Unremarkable      GALLBLADDER:  No calcified gallstones  No pericholecystic inflammatory change      SPLEEN:  Spleen measures 13 4 cm     PANCREAS:  No pancreatic ductal dilation seen  No pancreatic atrophy     ADRENAL GLANDS:  Unremarkable      STOMACH AND BOWEL:  Unremarkable      ABDOMINOPELVIC CAVITY:  No ascites  No free intraperitoneal air   No lymphadenopathy      VESSELS:  Unremarkable for patient's age      PELVIS     REPRODUCTIVE ORGANS:  Unremarkable for patient's age      APPENDIX: No findings to suggest appendicitis      ABDOMINAL WALL/INGUINAL REGIONS:  Unremarkable      OSSEOUS STRUCTURES:  A Schmorl's node seen along the inferior aspect of the L4 vertebra, stable  Small exostosis from the left iliac bone, stable     A lucency seen in the posterior aspect of the L1 vertebra, measuring 8 mm, stable  A lucent lesion seen in the T11 vertebra, measuring 1 4 cm, stable, seen in image 19 series 4  IMPRESSION:  There are less than 6 mm nodules in the left lung, Indeterminate  Follow-up suggested at 3 months for stability     No new visceral metastatic disease in the abdomen and pelvis with expected postoperative changes in the left nephrectomy bed     Lucent lesion in the T11 vertebra, stable since previous study of June 8, 2021  The study was marked in [facility] for significant notification  PATHOLOGY:     8/23/21  Final Diagnosis   A  Soft Tissue, Other, fat overlying tumor:  -Benign mature adipose tissue with mild hemorrhage  -No evidence of malignancy seen     B  Kidney, Left, nephrectomy:  -Clear cell renal cell carcinoma,  2 9  x 2 9  x 3 3 cm, confined to the kidney  -Vascular and ureteral margin are negative for malignancy   -Background kidney with no pathological changes  -Adrenal gland is not submitted   -No lymph node identified     ASSESSMENT:     65 y o  male with pT1a ccRCC now s/p robotic nephrectomy    PLAN:       Reviewed images and blood work  Recommend follow-up CT chest in 3 months time  Patient's preoperative chest imaging was a chest x-ray  I have low suspicion for the 6 mm pulmonary nodules but close follow-up is warranted  Patient will need continued yearly follow-up of his stage pathologic T1a renal cell carcinoma  CT chest abdomen pelvis and BMP in 1 year  Reviewed renal function which is stable after surgical removal of the left kidney  Discussed monitoring and management of surgical renal disease  If we see progression, Nephrology referral would be warranted  Discussed avoidance of nonsteroidal anti-inflammatories or renal toxic medications  Prostate exam performed today  Smooth prostate  PSA reviewed from December, next PSA due 12/20/2022

## 2022-09-16 ENCOUNTER — LAB (OUTPATIENT)
Dept: LAB | Facility: HOSPITAL | Age: 57
End: 2022-09-16
Attending: UROLOGY
Payer: COMMERCIAL

## 2022-09-16 DIAGNOSIS — N28.89 LEFT RENAL MASS: ICD-10-CM

## 2022-09-16 LAB — PSA SERPL-MCNC: 0.1 NG/ML (ref 0–4)

## 2022-09-16 PROCEDURE — G0103 PSA SCREENING: HCPCS

## 2022-09-16 PROCEDURE — 36415 COLL VENOUS BLD VENIPUNCTURE: CPT

## 2022-11-02 ENCOUNTER — HOSPITAL ENCOUNTER (OUTPATIENT)
Dept: CT IMAGING | Facility: HOSPITAL | Age: 57
Discharge: HOME | End: 2022-11-02
Attending: UROLOGY

## 2022-11-02 DIAGNOSIS — N28.89 LEFT RENAL MASS: ICD-10-CM

## 2022-11-04 DIAGNOSIS — N28.89 LEFT RENAL MASS: Primary | ICD-10-CM

## 2022-11-07 ENCOUNTER — TELEPHONE (OUTPATIENT)
Dept: UROLOGY | Facility: CLINIC | Age: 57
End: 2022-11-07

## 2022-11-07 NOTE — TELEPHONE ENCOUNTER
----- Message from Giorgio Mitchell MD sent at 11/4/2022  1:23 PM EDT -----  Please let patient know CT scan is stable    Follow-up in August of 2023 with CT abdomen and CT chest

## 2022-11-07 NOTE — TELEPHONE ENCOUNTER
Detailed vm left for pt with office number to set up follow up appt     Please set up Follow-up in August of 2023 with CT abdomen and CT chest

## 2023-03-09 ENCOUNTER — DOCTOR'S OFFICE (OUTPATIENT)
Dept: URBAN - NONMETROPOLITAN AREA CLINIC 1 | Facility: CLINIC | Age: 58
Setting detail: OPHTHALMOLOGY
End: 2023-03-09
Payer: COMMERCIAL

## 2023-03-09 DIAGNOSIS — H25.13: ICD-10-CM

## 2023-03-09 DIAGNOSIS — H40.003: ICD-10-CM

## 2023-03-09 DIAGNOSIS — H43.812: ICD-10-CM

## 2023-03-09 DIAGNOSIS — E11.9: ICD-10-CM

## 2023-03-09 PROBLEM — H40.002 GLAUCOMA SUSPECT; RIGHT EYE, LEFT EYE, BOTH EYES: Status: ACTIVE | Noted: 2023-03-09

## 2023-03-09 PROBLEM — H40.001 GLAUCOMA SUSPECT; RIGHT EYE, LEFT EYE, BOTH EYES: Status: ACTIVE | Noted: 2023-03-09

## 2023-03-09 PROCEDURE — 92134 CPTRZ OPH DX IMG PST SGM RTA: CPT | Performed by: OPHTHALMOLOGY

## 2023-03-09 PROCEDURE — 99204 OFFICE O/P NEW MOD 45 MIN: CPT | Performed by: OPHTHALMOLOGY

## 2023-03-09 ASSESSMENT — VISUAL ACUITY
OS_BCVA: 20/25
OD_BCVA: 20/25-1

## 2023-03-09 ASSESSMENT — KERATOMETRY
OS_AXISANGLE_DEGREES: 111
OD_K2POWER_DIOPTERS: 42.00
OS_K2POWER_DIOPTERS: 1.75
OS_K1POWER_DIOPTERS: 41.50
OD_K1POWER_DIOPTERS: 41.50
OD_AXISANGLE_DEGREES: 68

## 2023-03-09 ASSESSMENT — REFRACTION_AUTOREFRACTION
OD_AXIS: 108
OS_SPHERE: +0.75
OS_CYLINDER: -0.25
OS_AXIS: 79
OD_SPHERE: +0.75
OD_CYLINDER: -0.75

## 2023-03-09 ASSESSMENT — SPHEQUIV_DERIVED
OD_SPHEQUIV: 0.375
OS_SPHEQUIV: 0.625

## 2023-03-09 ASSESSMENT — AXIALLENGTH_DERIVED
OD_AL: 24.0993
OS_AL: 35.23

## 2023-03-09 ASSESSMENT — CONFRONTATIONAL VISUAL FIELD TEST (CVF)
OD_FINDINGS: FULL
OS_FINDINGS: FULL

## 2023-04-14 ENCOUNTER — DOCTOR'S OFFICE (OUTPATIENT)
Dept: URBAN - NONMETROPOLITAN AREA CLINIC 1 | Facility: CLINIC | Age: 58
Setting detail: OPHTHALMOLOGY
End: 2023-04-14
Payer: COMMERCIAL

## 2023-04-14 DIAGNOSIS — H40.1231: ICD-10-CM

## 2023-04-14 PROCEDURE — 92083 EXTENDED VISUAL FIELD XM: CPT | Performed by: OPHTHALMOLOGY

## 2023-04-14 PROCEDURE — 92020 GONIOSCOPY: CPT | Performed by: OPHTHALMOLOGY

## 2023-04-14 PROCEDURE — 99214 OFFICE O/P EST MOD 30 MIN: CPT | Performed by: OPHTHALMOLOGY

## 2023-04-14 PROCEDURE — 92133 CPTRZD OPH DX IMG PST SGM ON: CPT | Performed by: OPHTHALMOLOGY

## 2023-04-14 PROCEDURE — 76514 ECHO EXAM OF EYE THICKNESS: CPT | Performed by: OPHTHALMOLOGY

## 2023-04-14 ASSESSMENT — PACHYMETRY
OD_CT_UM: 596
OS_CT_CORRECTION: -6
OS_CT_UM: 620
OD_CT_CORRECTION: -4

## 2023-04-14 ASSESSMENT — SPHEQUIV_DERIVED
OD_SPHEQUIV: 1
OS_SPHEQUIV: 1.25

## 2023-04-14 ASSESSMENT — KERATOMETRY
OD_AXISANGLE_DEGREES: 078
OD_K1POWER_DIOPTERS: 41.50
OS_K1POWER_DIOPTERS: 41.75
OD_K2POWER_DIOPTERS: 42.00
OS_AXISANGLE_DEGREES: 089
OS_K2POWER_DIOPTERS: 42.25

## 2023-04-14 ASSESSMENT — REFRACTION_AUTOREFRACTION
OD_SPHERE: +1.25
OD_CYLINDER: -0.50
OS_CYLINDER: 0.00
OD_AXIS: 096
OS_AXIS: 180
OS_SPHERE: +1.25

## 2023-04-14 ASSESSMENT — VISUAL ACUITY
OD_BCVA: 20/20-2
OS_BCVA: 20/20-2

## 2023-04-14 ASSESSMENT — AXIALLENGTH_DERIVED
OS_AL: 23.656
OD_AL: 23.8478

## 2023-06-09 ENCOUNTER — RX ONLY (RX ONLY)
Age: 58
End: 2023-06-09

## 2023-06-09 ENCOUNTER — DOCTOR'S OFFICE (OUTPATIENT)
Dept: URBAN - NONMETROPOLITAN AREA CLINIC 1 | Facility: CLINIC | Age: 58
Setting detail: OPHTHALMOLOGY
End: 2023-06-09
Payer: COMMERCIAL

## 2023-06-09 DIAGNOSIS — H40.1231: ICD-10-CM

## 2023-06-09 PROCEDURE — 99213 OFFICE O/P EST LOW 20 MIN: CPT | Performed by: OPHTHALMOLOGY

## 2023-06-09 ASSESSMENT — REFRACTION_AUTOREFRACTION
OD_CYLINDER: -0.50
OS_CYLINDER: 0.00
OD_SPHERE: +1.25
OS_SPHERE: +1.25
OD_AXIS: 096
OS_AXIS: 180

## 2023-06-09 ASSESSMENT — PACHYMETRY
OS_CT_CORRECTION: -6
OD_CT_CORRECTION: -4
OS_CT_UM: 620
OD_CT_UM: 596

## 2023-06-09 ASSESSMENT — SPHEQUIV_DERIVED
OS_SPHEQUIV: 1.25
OD_SPHEQUIV: 1

## 2023-06-09 ASSESSMENT — AXIALLENGTH_DERIVED
OD_AL: 23.8478
OS_AL: 23.656

## 2023-06-09 ASSESSMENT — VISUAL ACUITY
OD_BCVA: 20/25-2
OS_BCVA: 20/25-1

## 2023-06-09 ASSESSMENT — KERATOMETRY
OS_AXISANGLE_DEGREES: 089
OS_K2POWER_DIOPTERS: 42.25
OS_K1POWER_DIOPTERS: 41.75
OD_K1POWER_DIOPTERS: 41.50
OD_AXISANGLE_DEGREES: 078
OD_K2POWER_DIOPTERS: 42.00

## 2023-07-26 ENCOUNTER — DOCTOR'S OFFICE (OUTPATIENT)
Dept: URBAN - NONMETROPOLITAN AREA CLINIC 1 | Facility: CLINIC | Age: 58
Setting detail: OPHTHALMOLOGY
End: 2023-07-26
Payer: COMMERCIAL

## 2023-07-26 DIAGNOSIS — H40.1231: ICD-10-CM

## 2023-07-26 PROCEDURE — 99213 OFFICE O/P EST LOW 20 MIN: CPT | Performed by: OPHTHALMOLOGY

## 2023-07-26 ASSESSMENT — SPHEQUIV_DERIVED
OS_SPHEQUIV: 1.25
OD_SPHEQUIV: 1.375

## 2023-07-26 ASSESSMENT — VISUAL ACUITY
OD_BCVA: 20/25+2
OS_BCVA: 20/30+2

## 2023-07-26 ASSESSMENT — REFRACTION_AUTOREFRACTION
OS_CYLINDER: -0.50
OD_SPHERE: +2.00
OS_AXIS: 95
OS_SPHERE: +1.50
OD_AXIS: 117
OD_CYLINDER: -1.25

## 2023-07-26 ASSESSMENT — KERATOMETRY
OS_K2POWER_DIOPTERS: 42.00
OD_K2POWER_DIOPTERS: 42.25
OD_AXISANGLE_DEGREES: 49
OS_K1POWER_DIOPTERS: 42.00
OD_K1POWER_DIOPTERS: 41.75

## 2023-07-26 ASSESSMENT — AXIALLENGTH_DERIVED
OD_AL: 23.6072
OS_AL: 23.656

## 2023-07-26 ASSESSMENT — CONFRONTATIONAL VISUAL FIELD TEST (CVF)
OS_FINDINGS: FULL
OD_FINDINGS: FULL

## 2023-07-26 ASSESSMENT — PACHYMETRY
OD_CT_UM: 596
OS_CT_UM: 620
OD_CT_CORRECTION: -4
OS_CT_CORRECTION: -6

## 2023-08-18 ENCOUNTER — TELEPHONE (OUTPATIENT)
Dept: ADMINISTRATIVE | Facility: HOSPITAL | Age: 58
End: 2023-08-18

## 2023-08-18 NOTE — TELEPHONE ENCOUNTER
Renal cancer postop surveillance  Keep Ct Chest as approved  Change to CT abdomen only  There are already orders in for both ct chest and ct abdomen separate (pending not yet scheduled)  The old ct chest/abdomen/pelvis (scheduled) should be cancelled    Please cancel p2p and schedule the ct chest and ct abdomen on those orders and cancel the old duplicate

## 2023-08-23 ENCOUNTER — TELEPHONE (OUTPATIENT)
Dept: UROLOGY | Facility: CLINIC | Age: 58
End: 2023-08-23

## 2023-08-23 ENCOUNTER — TELEPHONE (OUTPATIENT)
Age: 58
End: 2023-08-23

## 2023-08-23 NOTE — TELEPHONE ENCOUNTER
Patient cannot do the rescheduled date for 8/30/2023. Please assist in scheduling. Appointments were blocked.

## 2023-08-23 NOTE — TELEPHONE ENCOUNTER
Detailed message left for patient in regards to upcoming appointment with Dr. Fili Delvalle 9/7/23 at 1:45 needing to be canceled due to provider out of office. Appointment is rescheduled with Dr. Fili Delvalle 8/30/23 with a 10:45 arrival.  Asked patient to call back to confirm new appointment. [employer] message sent to patient as well.

## 2023-08-24 ENCOUNTER — APPOINTMENT (OUTPATIENT)
Dept: LAB | Facility: HOSPITAL | Age: 58
End: 2023-08-24
Payer: COMMERCIAL

## 2023-08-24 DIAGNOSIS — N18.31 STAGE 3A CHRONIC KIDNEY DISEASE (CKD) (HCC): ICD-10-CM

## 2023-08-24 DIAGNOSIS — N28.89 LEFT RENAL MASS: ICD-10-CM

## 2023-08-24 DIAGNOSIS — E11.9 TYPE 2 DIABETES MELLITUS WITH HEMOGLOBIN A1C GOAL OF LESS THAN 7.0% (HCC): ICD-10-CM

## 2023-08-24 DIAGNOSIS — Z13.6 SCREENING FOR CARDIOVASCULAR CONDITION: ICD-10-CM

## 2023-08-24 LAB
ANION GAP SERPL CALCULATED.3IONS-SCNC: 6 MMOL/L
BUN SERPL-MCNC: 32 MG/DL (ref 5–25)
CALCIUM SERPL-MCNC: 9.7 MG/DL (ref 8.4–10.2)
CHLORIDE SERPL-SCNC: 104 MMOL/L (ref 96–108)
CHOLEST SERPL-MCNC: 195 MG/DL
CO2 SERPL-SCNC: 28 MMOL/L (ref 21–32)
CREAT SERPL-MCNC: 1.59 MG/DL (ref 0.6–1.3)
ERYTHROCYTE [DISTWIDTH] IN BLOOD BY AUTOMATED COUNT: 13 % (ref 11.6–15.1)
EST. AVERAGE GLUCOSE BLD GHB EST-MCNC: 143 MG/DL
GFR SERPL CREATININE-BSD FRML MDRD: 47 ML/MIN/1.73SQ M
GLUCOSE P FAST SERPL-MCNC: 116 MG/DL (ref 65–99)
HBA1C MFR BLD: 6.6 %
HCT VFR BLD AUTO: 48 % (ref 36.5–49.3)
HDLC SERPL-MCNC: 49 MG/DL
HGB BLD-MCNC: 15.2 G/DL (ref 12–17)
LDLC SERPL CALC-MCNC: 128 MG/DL (ref 0–100)
MCH RBC QN AUTO: 28.4 PG (ref 26.8–34.3)
MCHC RBC AUTO-ENTMCNC: 31.7 G/DL (ref 31.4–37.4)
MCV RBC AUTO: 90 FL (ref 82–98)
PHOSPHATE SERPL-MCNC: 3.1 MG/DL (ref 2.7–4.5)
PLATELET # BLD AUTO: 188 THOUSANDS/UL (ref 149–390)
PMV BLD AUTO: 10.4 FL (ref 8.9–12.7)
POTASSIUM SERPL-SCNC: 4.1 MMOL/L (ref 3.5–5.3)
RBC # BLD AUTO: 5.36 MILLION/UL (ref 3.88–5.62)
SODIUM SERPL-SCNC: 138 MMOL/L (ref 135–147)
TRIGL SERPL-MCNC: 92 MG/DL
WBC # BLD AUTO: 5.5 THOUSAND/UL (ref 4.31–10.16)

## 2023-08-24 PROCEDURE — 83036 HEMOGLOBIN GLYCOSYLATED A1C: CPT

## 2023-08-24 PROCEDURE — 80048 BASIC METABOLIC PNL TOTAL CA: CPT

## 2023-08-24 PROCEDURE — 80061 LIPID PANEL: CPT

## 2023-08-24 PROCEDURE — 85027 COMPLETE CBC AUTOMATED: CPT

## 2023-08-24 PROCEDURE — 36415 COLL VENOUS BLD VENIPUNCTURE: CPT

## 2023-08-24 PROCEDURE — 84100 ASSAY OF PHOSPHORUS: CPT

## 2023-08-29 ENCOUNTER — HOSPITAL ENCOUNTER (OUTPATIENT)
Dept: CT IMAGING | Facility: HOSPITAL | Age: 58
Discharge: HOME | End: 2023-08-29
Attending: UROLOGY
Payer: COMMERCIAL

## 2023-08-29 ENCOUNTER — DOCUMENTATION (OUTPATIENT)
Dept: OTHER | Facility: HOSPITAL | Age: 58
End: 2023-08-29

## 2023-08-29 ENCOUNTER — TELEPHONE (OUTPATIENT)
Dept: ADMINISTRATIVE | Facility: HOSPITAL | Age: 58
End: 2023-08-29

## 2023-08-29 DIAGNOSIS — N28.89 LEFT RENAL MASS: Primary | ICD-10-CM

## 2023-08-29 PROCEDURE — G1004 CDSM NDSC: HCPCS

## 2023-08-29 PROCEDURE — 71250 CT THORAX DX C-: CPT

## 2023-08-29 PROCEDURE — 74150 CT ABDOMEN W/O CONTRAST: CPT

## 2023-08-29 NOTE — TELEPHONE ENCOUNTER
Spoke with patient and he will be scheduled with Dr. Antonio Jones at [facility] 9/11/23 at 1:30. Email sent to [facility] to request template unhold and place appointment in schedule.

## 2023-09-11 ENCOUNTER — OFFICE VISIT (OUTPATIENT)
Dept: UROLOGY | Facility: CLINIC | Age: 58
End: 2023-09-11
Payer: COMMERCIAL

## 2023-09-11 ENCOUNTER — TELEPHONE (OUTPATIENT)
Dept: UROLOGY | Facility: CLINIC | Age: 58
End: 2023-09-11

## 2023-09-11 ENCOUNTER — HOSPITAL ENCOUNTER (OUTPATIENT)
Dept: ULTRASOUND IMAGING | Facility: MEDICAL CENTER | Age: 58
Discharge: HOME | End: 2023-09-11
Payer: COMMERCIAL

## 2023-09-11 VITALS
HEART RATE: 57 BPM | OXYGEN SATURATION: 97 % | WEIGHT: 204 LBS | BODY MASS INDEX: 30.21 KG/M2 | SYSTOLIC BLOOD PRESSURE: 120 MMHG | HEIGHT: 69 IN | DIASTOLIC BLOOD PRESSURE: 70 MMHG

## 2023-09-11 DIAGNOSIS — N50.89 SCROTAL SWELLING: ICD-10-CM

## 2023-09-11 DIAGNOSIS — N28.89 LEFT RENAL MASS: Primary | ICD-10-CM

## 2023-09-11 DIAGNOSIS — Z12.5 SCREENING FOR PROSTATE CANCER: ICD-10-CM

## 2023-09-11 PROCEDURE — 76870 US EXAM SCROTUM: CPT

## 2023-09-11 PROCEDURE — 99214 OFFICE O/P EST MOD 30 MIN: CPT | Performed by: UROLOGY

## 2023-09-11 RX ORDER — DORZOLAMIDE HYDROCHLORIDE AND TIMOLOL MALEATE 20; 5 MG/ML; MG/ML
1 SOLUTION/ DROPS OPHTHALMIC 2 TIMES DAILY
COMMUNITY

## 2023-09-11 NOTE — PROGRESS NOTES
UROLOGY FOLLOW-UP NOTE     CHIEF COMPLAINT   Lore Oro is a 63 y.o. male with a complaint of   No chief complaint on file. History of Present Illness:     63 y.o. male seen in the emergency room and diagnosed with COVID-19 in early May. Patient has had multiple family members who have had the disease and 2 more remote family members who have passed away. He is out of his core team period and only having some mild symptoms. Patient has right and left-sided back pain. He works in the [employer]. He is a nonsmoker and has no family history of kidney cancer. Incidental left upper pole renal mass was noted at the time of his ER imaging. Patient was scheduled for a partial nephrectomy robotically. Intraoperatively, the patient was noted to have 3 hilar vessels. We were able to mobilize the complete kidney but given the superior position and the fixation from the hilar vessels and ureter, we were unable to complete the partial.  This was converted to radical nephrectomy which was uncomplicated. Patient now s/p robotic partial converted to radical nephrectomy given hilar complexity in 8/2021. Patient is wife present in follow-up. He reports acute onset of left scrotal swelling and itching over the last 3 to 4 months. He has not sought any medical attention for this. He denies any trauma around the time it presented. He has no pain around the area. No bother with urination, frequency urgency or hematuria.     Lab Results   Component Value Date    PSA 0.1 09/16/2022    PSA 0.2 12/15/2021         Past Medical History:     Past Medical History:   Diagnosis Date   • Cancer ([facility])    • Diabetes mellitus ([facility])     TYPE 2-borderline   diet controlled   • History of COVID-19 05/10/2021    Mild Symptoms   • Left kidney mass     L partial nephrectomy today 8/23/2021       PAST SURGICAL HISTORY:     Past Surgical History:   Procedure Laterality Date   • COLONOSCOPY     • ME LAPAROSCOPY SURG PARTIAL NEPHRECTOMY Left 8/23/2021    Procedure: RADICAL NEPHRECTOMY  LAPAROSCOPIC W ROBOTICS;  Surgeon: Salud Lee MD;  Location: [facility];  Service: Urology   • TONSILLECTOMY         CURRENT MEDICATIONS:     Current Outpatient Medications   Medication Sig Dispense Refill   • Dorzolamide HCl-Timolol Mal PF 22.3-6.8 MG/ML SOLN Apply 1 drop to eye 2 (two) times a day       No current facility-administered medications for this visit. ALLERGIES:   No Known Allergies    SOCIAL HISTORY:     Social History     Socioeconomic History   • Marital status: /Civil Union     Spouse name: None   • Number of children: None   • Years of education: None   • Highest education level: None   Occupational History   • None   Tobacco Use   • Smoking status: Never   • Smokeless tobacco: Never   Vaping Use   • Vaping Use: Never used   Substance and Sexual Activity   • Alcohol use: Not Currently   • Drug use: Never   • Sexual activity: Yes   Other Topics Concern   • None   Social History Narrative   • None     Social Determinants of Health     Financial Resource Strain: Not on file   Food Insecurity: Not on file   Transportation Needs: Not on file   Physical Activity: Not on file   Stress: Not on file   Social Connections: Not on file   Intimate Partner Violence: Not on file   Housing Stability: Not on file       SOCIAL HISTORY:   History reviewed. No pertinent family history. REVIEW OF SYSTEMS:     Review of Systems   Constitutional: Negative. Respiratory: Negative. Cardiovascular: Negative. Gastrointestinal: Negative. Genitourinary: Negative. Musculoskeletal: Negative. Negative for back pain and myalgias. Skin: Negative. Psychiatric/Behavioral: Negative. PHYSICAL EXAM:     /70   Pulse 57   Ht 5' 9" (1.753 m)   Wt 92.5 kg (204 lb)   SpO2 97%   BMI 30.13 kg/m²     General:  Healthy appearing male in no acute distress. They have a normal affect.   There is not appear to be any gross neurologic defects or abnormalities. HEENT:  Normocephalic, atraumatic. Neck is supple without any palpable lymphadenopathy  Cardiovascular:  Patient has normal palpable distal radial pulses. There is no significant peripheral edema. No JVD is noted. Respiratory:  Patient has unlabored respirations. There is no audible wheeze or rhonchi. Abdomen:  Abdomen is soft and nontender. Well-healed laparoscopic scars. There is no tympany. Inguinal and umbilical hernia are not appreciated. Right inguinal hernia scar healed  :  Circumcised phallus, orthotopic meatus, left hemiscrotum is significantly distended with moderate tense fluid versus mass in the left hemiscrotum without ability to palpate the testicle, rectal exam is a smooth prostate  Musculoskeletal:  Patient does not have significant CVA tenderness in the  flank with palpation or percussion. They full range of motion in all 4 extremities. Strength in all 4 extremities appears congruent. Patient is able to ambulate without assistance or difficulty. Dermatologic:  Patient has no skin abnormalities or rashes. LABS:     CBC:   Lab Results   Component Value Date    WBC 5.50 2023    HGB 15.2 2023    HCT 48.0 2023    MCV 90 2023     2023       BMP:   Lab Results   Component Value Date    CALCIUM 9.7 2023    K 4.1 2023    CO2 28 2023     2023    BUN 32 (H) 2023    CREATININE 1.59 (H) 2023     Lab Results   Component Value Date    PSA 0.1 2022    PSA 0.2 12/15/2021       IMAGIN/29/23  CT CHEST AND ABDOMEN WITHOUT IV CONTRAST        INDICATION:   N28.89: Other specified disorders of kidney and ureter.  History of left nephrectomy for renal cell carcinoma     COMPARISON: 2022; 8/10/2022; 2021; 5/10/2021     TECHNIQUE:  CT examination of the chest and abdomen was performed without intravenous contrast. Multiplanar 2D reformatted images were created from the source data. The study is limited without contrast.     This examination, like all CT scans performed in the [facility], was performed utilizing techniques to minimize radiation dose exposure, including the use of iterative reconstruction and automated exposure control. Radiation dose length   product (DLP) for this visit:  661 mGy-cm     IV Contrast: None given  Enteric Contrast:  Enteric contrast was not administered.     FINDINGS:     CHEST     LUNGS: Left upper lobe 2 mm nodule, image 80, stable. Left upper lobe 2 mm nodule, image 104, series 3, stable. Left lower lobe 2 mm nodule, image 191, stable. Left lower lobe 2 mm subpleural nodule, image 148, stable. A few other tiny nodules less than 4 mm in size are stable.     There is no tracheal or endobronchial lesion.     PLEURA:  Unremarkable.     HEART/GREAT VESSELS: Heart is unremarkable for patient's age. No thoracic aortic aneurysm.     MEDIASTINUM AND CHARLIE: Small pretracheal lymph nodes are unchanged.     CHEST WALL AND LOWER NECK:  Unremarkable.     ABDOMEN     LIVER/BILIARY TREE:  Unremarkable.     GALLBLADDER:  No calcified gallstones. No pericholecystic inflammatory change.     SPLEEN:  Unremarkable.     PANCREAS:  Unremarkable.     ADRENAL GLANDS:  Unremarkable.     KIDNEYS/URETERS: Status post left nephrectomy. No hydronephrosis. Renal scarring is evident on the right with volume loss medially without significant change. No obvious focal lesion on this noncontrast examination. .     The left renal fossa demonstrates surgical sutures as well as scarring/evidence of fat necrosis without change.     VISUALIZED BOWEL: Small hiatal hernia. 1 isolated loop of borderline small bowel dilatation is seen in the mid abdomen may be related to peristalsis. The visualized colon is unremarkable with the exception of diverticula. .     VISUALIZED ABDOMINOPELVIC CAVITY:  No ascites. No pneumoperitoneum.   No lymphadenopathy.     OSSEOUS STRUCTURES:  No acute fracture or destructive osseous lesion. Lucency in the T11 vertebral body with some sclerotic areas centrally is unchanged and may represent hemangioma on the left.     Tiny periumbilical hernia of fat is identified.     IMPRESSION:     Tiny lung nodules are not significantly changed.     No new adenopathy. PATHOLOGY:     8/23/21  Final Diagnosis   A. Soft Tissue, Other, fat overlying tumor:  -Benign mature adipose tissue with mild hemorrhage  -No evidence of malignancy seen     B. Kidney, Left, nephrectomy:  -Clear cell renal cell carcinoma,  2.9  x 2.9  x 3.3 cm, confined to the kidney  -Vascular and ureteral margin are negative for malignancy.  -Background kidney with no pathological changes  -Adrenal gland is not submitted   -No lymph node identified     ASSESSMENT:     63 y.o. male with pT1a ccRCC now s/p robotic nephrectomy 8/2021    PLAN:       I am concerned about the patient's acute scrotal change and persistence over the last 3 to 4 months. I recommended a stat scrotal ultrasound to evaluate for any underlying concerns for testicular mass or potentially benign fluid etiology versus hernia. Reviewed images and blood work. Small pulmonary nodules are stable. No signs of metastatic disease within the abdomen. We will follow-up surveillance films in 1 year. Reviewed renal function which is stable after surgical removal of the left kidney. Discussed monitoring and management of surgical renal disease. If we see progression, Nephrology referral would be warranted. Discussed avoidance of nonsteroidal anti-inflammatories or renal toxic medications. Prostate exam performed today. Smooth prostate. Update PSA in 2 weeks and then yearly.     I will contact patient with results of his scrotal ultrasound

## 2023-09-11 NOTE — TELEPHONE ENCOUNTER
Contacted the patient first that he will cell phone number which was not answered with no voicemail set up. Contacted the patient's wife cell phone number and the patient picked up. Reviewed with him his stat ultrasound findings which demonstrated a complex hydrocele. No testicular tumors. Discussed with the patient some conservative measures for short period of time and that if the issue does not resolve or becomes more bothersome, we can have a conversation about hydrocelectomy. He will contact me in the next 2 to 3 months if the issue persists or worsens.

## 2023-09-26 ENCOUNTER — APPOINTMENT (OUTPATIENT)
Dept: LAB | Facility: HOSPITAL | Age: 58
End: 2023-09-26
Payer: COMMERCIAL

## 2023-09-26 DIAGNOSIS — Z12.5 SCREENING FOR PROSTATE CANCER: ICD-10-CM

## 2023-09-26 LAB — PSA SERPL-MCNC: 0.05 NG/ML (ref 0–4)

## 2023-09-26 PROCEDURE — 36415 COLL VENOUS BLD VENIPUNCTURE: CPT

## 2023-09-26 PROCEDURE — G0103 PSA SCREENING: HCPCS

## 2024-01-24 ENCOUNTER — DOCTOR'S OFFICE (OUTPATIENT)
Dept: URBAN - NONMETROPOLITAN AREA CLINIC 1 | Facility: CLINIC | Age: 59
Setting detail: OPHTHALMOLOGY
End: 2024-01-24
Payer: COMMERCIAL

## 2024-01-24 DIAGNOSIS — H40.1231: ICD-10-CM

## 2024-01-24 DIAGNOSIS — E11.9: ICD-10-CM

## 2024-01-24 DIAGNOSIS — H25.13: ICD-10-CM

## 2024-01-24 DIAGNOSIS — H43.812: ICD-10-CM

## 2024-01-24 DIAGNOSIS — H11.31: ICD-10-CM

## 2024-01-24 PROCEDURE — 99214 OFFICE O/P EST MOD 30 MIN: CPT | Performed by: OPHTHALMOLOGY

## 2024-01-24 PROCEDURE — 92133 CPTRZD OPH DX IMG PST SGM ON: CPT | Performed by: OPHTHALMOLOGY

## 2024-01-24 ASSESSMENT — SPHEQUIV_DERIVED
OD_SPHEQUIV: 1.125
OS_SPHEQUIV: 1.375

## 2024-01-24 ASSESSMENT — CONFRONTATIONAL VISUAL FIELD TEST (CVF)
OS_FINDINGS: FULL
OD_FINDINGS: FULL

## 2024-01-24 ASSESSMENT — REFRACTION_AUTOREFRACTION
OS_CYLINDER: -0.25
OD_CYLINDER: -0.75
OS_AXIS: 78
OD_AXIS: 112
OS_SPHERE: +1.50
OD_SPHERE: +1.50

## 2024-06-27 ENCOUNTER — TELEPHONE (OUTPATIENT)
Age: 59
End: 2024-06-27

## 2024-06-27 DIAGNOSIS — Z12.5 SCREENING FOR PROSTATE CANCER: Primary | ICD-10-CM

## 2024-07-19 ENCOUNTER — DOCTOR'S OFFICE (OUTPATIENT)
Dept: URBAN - NONMETROPOLITAN AREA CLINIC 1 | Facility: CLINIC | Age: 59
Setting detail: OPHTHALMOLOGY
End: 2024-07-19
Payer: COMMERCIAL

## 2024-07-19 DIAGNOSIS — H25.13: ICD-10-CM

## 2024-07-19 DIAGNOSIS — E11.9: ICD-10-CM

## 2024-07-19 DIAGNOSIS — H43.812: ICD-10-CM

## 2024-07-19 DIAGNOSIS — H11.31: ICD-10-CM

## 2024-07-19 DIAGNOSIS — H40.1231: ICD-10-CM

## 2024-07-19 PROCEDURE — 99213 OFFICE O/P EST LOW 20 MIN: CPT | Performed by: OPHTHALMOLOGY

## 2024-07-19 PROCEDURE — 76514 ECHO EXAM OF EYE THICKNESS: CPT | Performed by: OPHTHALMOLOGY

## 2024-07-19 PROCEDURE — 92020 GONIOSCOPY: CPT | Performed by: OPHTHALMOLOGY

## 2024-07-19 PROCEDURE — 92083 EXTENDED VISUAL FIELD XM: CPT | Performed by: OPHTHALMOLOGY

## 2024-07-19 ASSESSMENT — CONFRONTATIONAL VISUAL FIELD TEST (CVF)
OS_FINDINGS: FULL
OD_FINDINGS: FULL

## 2024-09-04 ENCOUNTER — LAB (OUTPATIENT)
Dept: LAB | Facility: HOSPITAL | Age: 59
End: 2024-09-04
Payer: COMMERCIAL

## 2024-09-04 DIAGNOSIS — N28.89 LEFT RENAL MASS: ICD-10-CM

## 2024-09-04 DIAGNOSIS — Z12.5 SCREENING FOR PROSTATE CANCER: ICD-10-CM

## 2024-09-04 LAB
ANION GAP SERPL CALCULATED.3IONS-SCNC: 6 MMOL/L (ref 4–13)
BUN SERPL-MCNC: 27 MG/DL (ref 5–25)
CALCIUM SERPL-MCNC: 9.6 MG/DL (ref 8.4–10.2)
CHLORIDE SERPL-SCNC: 102 MMOL/L (ref 96–108)
CO2 SERPL-SCNC: 28 MMOL/L (ref 21–32)
CREAT SERPL-MCNC: 1.34 MG/DL (ref 0.6–1.3)
GFR SERPL CREATININE-BSD FRML MDRD: 57 ML/MIN/1.73SQ M
GLUCOSE P FAST SERPL-MCNC: 109 MG/DL (ref 65–99)
POTASSIUM SERPL-SCNC: 4.5 MMOL/L (ref 3.5–5.3)
PSA SERPL-MCNC: 0.14 NG/ML (ref 0–4)
SODIUM SERPL-SCNC: 136 MMOL/L (ref 135–147)

## 2024-09-04 PROCEDURE — G0103 PSA SCREENING: HCPCS

## 2024-09-04 PROCEDURE — 80048 BASIC METABOLIC PNL TOTAL CA: CPT

## 2024-09-04 PROCEDURE — 36415 COLL VENOUS BLD VENIPUNCTURE: CPT

## 2024-09-04 PROCEDURE — 84153 ASSAY OF PSA TOTAL: CPT

## 2024-09-09 ENCOUNTER — HOSPITAL ENCOUNTER (OUTPATIENT)
Dept: CT IMAGING | Facility: HOSPITAL | Age: 59
Discharge: HOME | End: 2024-09-09
Payer: COMMERCIAL

## 2024-09-09 DIAGNOSIS — N28.89 LEFT RENAL MASS: ICD-10-CM

## 2024-09-09 PROCEDURE — 71260 CT THORAX DX C+: CPT

## 2024-09-09 PROCEDURE — 74178 CT ABD&PLV WO CNTR FLWD CNTR: CPT

## 2024-09-09 PROCEDURE — G1004 CDSM NDSC: HCPCS

## 2024-09-09 RX ADMIN — IOHEXOL 100 ML: 350 INJECTION, SOLUTION INTRAVENOUS at 09:42

## 2024-10-06 PROBLEM — N43.3 LEFT HYDROCELE: Status: ACTIVE | Noted: 2024-10-06

## 2024-10-06 PROBLEM — R35.1 NOCTURIA: Status: ACTIVE | Noted: 2024-10-06

## 2024-10-06 PROBLEM — C64.9 RENAL CELL CARCINOMA (HCC): Status: ACTIVE | Noted: 2024-10-06

## 2024-10-07 ENCOUNTER — OFFICE VISIT (OUTPATIENT)
Dept: UROLOGY | Facility: CLINIC | Age: 59
End: 2024-10-07
Payer: COMMERCIAL

## 2024-10-07 VITALS
OXYGEN SATURATION: 99 % | WEIGHT: 209 LBS | TEMPERATURE: 97.6 F | SYSTOLIC BLOOD PRESSURE: 124 MMHG | HEIGHT: 69 IN | HEART RATE: 54 BPM | BODY MASS INDEX: 30.96 KG/M2 | DIASTOLIC BLOOD PRESSURE: 80 MMHG | RESPIRATION RATE: 16 BRPM

## 2024-10-07 DIAGNOSIS — C64.9 RENAL CELL CARCINOMA, UNSPECIFIED LATERALITY (HCC): ICD-10-CM

## 2024-10-07 DIAGNOSIS — R35.1 NOCTURIA: Primary | ICD-10-CM

## 2024-10-07 DIAGNOSIS — E11.9 TYPE 2 DIABETES MELLITUS WITHOUT COMPLICATION, WITHOUT LONG-TERM CURRENT USE OF INSULIN (HCC): ICD-10-CM

## 2024-10-07 DIAGNOSIS — N43.3 LEFT HYDROCELE: ICD-10-CM

## 2024-10-07 LAB
SL AMB  POCT GLUCOSE, UA: NORMAL
SL AMB LEUKOCYTE ESTERASE,UA: NORMAL
SL AMB POCT BILIRUBIN,UA: NORMAL
SL AMB POCT BLOOD,UA: NORMAL
SL AMB POCT CLARITY,UA: CLEAR
SL AMB POCT COLOR,UA: YELLOW
SL AMB POCT KETONES,UA: NORMAL
SL AMB POCT NITRITE,UA: NORMAL
SL AMB POCT PH,UA: 6.5
SL AMB POCT SPECIFIC GRAVITY,UA: 1.01
SL AMB POCT URINE PROTEIN: NORMAL
SL AMB POCT UROBILINOGEN: 0.2

## 2024-10-07 PROCEDURE — 81003 URINALYSIS AUTO W/O SCOPE: CPT | Performed by: UROLOGY

## 2024-10-07 PROCEDURE — 99213 OFFICE O/P EST LOW 20 MIN: CPT | Performed by: UROLOGY

## 2024-10-07 NOTE — PROGRESS NOTES
UROLOGY PROGRESS NOTE         NAME: Man White Jr.  AGE: 59 y.o. SEX: male  : 1965   MRN: [id number]    DATE: 10/6/2024  TIME: 9:34 PM    Assessment and Plan      Impression:   1. Renal cell carcinoma, unspecified laterality (HCC)  2. Left hydrocele  3. Nocturia       Plan: Patient's most recent PSA was satisfactory CANDIDO and urinalysis are normal today's creatinine was 1.34.  Most recent CT scan does not show any evidence of recurrence in the nephrectomy bed or in regard to the contralateral kidney.  He has a stable left hydrocele which at this point he prefers to manage expectantly.  We will see him back in 1 year.  This should be with a repeat CT scan and PSA.  His wife was with him today and their questions were answered      Chief Complaint   No chief complaint on file.    History of Present Illness     HPI: Man White Jr. is a 59 y.o. year old male who presents with previous history of a laparoscopic nephrectomy with Dr. Mckeon.  Patient's last PSA was 0.1 about 2 years ago.  Also has a left hydrocele.  Scrotal sonogram and most recent CT scan reviewed no evidence of any renal cell carcinoma recurrence or distinct metastasis.  PSA 0.14.  Creatinine 1.34 which is stable if not improved.              The following portions of the patient's history were reviewed and updated as appropriate: allergies, current medications, past family history, past medical history, past social history, past surgical history and problem list.  Past Medical History:   Diagnosis Date    Cancer (HCC)     Diabetes mellitus (HCC)     TYPE 2-borderline   diet controlled    History of COVID-19 05/10/2021    Mild Symptoms    Left kidney mass     L partial nephrectomy today 2021     Past Surgical History:   Procedure Laterality Date    COLONOSCOPY      TX LAPAROSCOPY SURG PARTIAL NEPHRECTOMY Left 2021    Procedure: RADICAL NEPHRECTOMY  LAPAROSCOPIC W ROBOTICS;  Surgeon: Ulises Mckeon MD;  Location: [facility];  Service: Urology    TONSILLECTOMY       shoulder  Review of Systems     Const: Denies chills, fever and weight loss.  CV: Denies chest pain.  Resp: Denies SOB.  GI: Denies abdominal pain, nausea and vomiting.  : Denies symptoms other than stated above.  Musculo: Denies back pain.    Objective   There were no vitals taken for this visit.    Physical Exam  Const: Appears healthy and well developed. No signs of acute distress present.  Resp: Respirations are regular and unlabored.   CV: Rate is regular. Rhythm is regular.  Abdomen: Abdomen is soft, nontender, and nondistended. Kidneys are not palpable.  : Prostate is 2-3+ smooth nontender soft.  External genitalia reveal a medium size left hydrocele which does extend slightly to the groin.  Mildly tense.  Right testicle displaced to the right and normal.  Nontender however.  No hernia.  Psych: Patient's attitude is cooperative. Mood is normal. Affect is normal.    Procedure   Procedures     Current Medications     Current Outpatient Medications:     Dorzolamide HCl-Timolol Mal PF 22.3-6.8 MG/ML SOLN, Apply 1 drop to eye 2 (two) times a day, Disp: , Rfl:         Conrad Leger MD

## 2025-01-28 ENCOUNTER — RX ONLY (RX ONLY)
Age: 60
End: 2025-01-28

## 2025-01-28 ENCOUNTER — DOCTOR'S OFFICE (OUTPATIENT)
Dept: URBAN - NONMETROPOLITAN AREA CLINIC 1 | Facility: CLINIC | Age: 60
Setting detail: OPHTHALMOLOGY
End: 2025-01-28
Payer: COMMERCIAL

## 2025-01-28 DIAGNOSIS — H43.812: ICD-10-CM

## 2025-01-28 DIAGNOSIS — E11.9: ICD-10-CM

## 2025-01-28 DIAGNOSIS — H25.13: ICD-10-CM

## 2025-01-28 DIAGNOSIS — H40.1231: ICD-10-CM

## 2025-01-28 PROCEDURE — 92133 CPTRZD OPH DX IMG PST SGM ON: CPT | Performed by: OPHTHALMOLOGY

## 2025-01-28 PROCEDURE — 92014 COMPRE OPH EXAM EST PT 1/>: CPT | Performed by: OPHTHALMOLOGY

## 2025-01-28 ASSESSMENT — PACHYMETRY
OD_CT_UM: 596
OD_CT_CORRECTION: -4
OS_CT_UM: 620
OS_CT_CORRECTION: -6

## 2025-01-28 ASSESSMENT — REFRACTION_AUTOREFRACTION
OD_AXIS: 098
OD_CYLINDER: -0.50
OD_SPHERE: +1.75
OS_AXIS: 045
OS_SPHERE: +1.75
OS_CYLINDER: -0.25

## 2025-01-28 ASSESSMENT — VISUAL ACUITY
OS_BCVA: 20/40-1
OD_BCVA: 20/30+2

## 2025-01-28 ASSESSMENT — TONOMETRY: OD_IOP_MMHG: 12

## 2025-01-28 ASSESSMENT — CONFRONTATIONAL VISUAL FIELD TEST (CVF)
OS_FINDINGS: FULL
OD_FINDINGS: FULL

## 2025-01-28 ASSESSMENT — KERATOMETRY
OS_K2POWER_DIOPTERS: 42.00
OS_K1POWER_DIOPTERS: 41.75
OD_AXISANGLE_DEGREES: 044
OS_AXISANGLE_DEGREES: 098
OD_K2POWER_DIOPTERS: 42.00
OD_K1POWER_DIOPTERS: 41.50

## 2025-07-29 ENCOUNTER — DOCTOR'S OFFICE (OUTPATIENT)
Dept: URBAN - NONMETROPOLITAN AREA CLINIC 1 | Facility: CLINIC | Age: 60
Setting detail: OPHTHALMOLOGY
End: 2025-07-29
Payer: COMMERCIAL

## 2025-07-29 DIAGNOSIS — H40.1231: ICD-10-CM

## 2025-07-29 DIAGNOSIS — H25.13: ICD-10-CM

## 2025-07-29 PROCEDURE — 92020 GONIOSCOPY: CPT | Performed by: OPHTHALMOLOGY

## 2025-07-29 PROCEDURE — 99213 OFFICE O/P EST LOW 20 MIN: CPT | Performed by: OPHTHALMOLOGY

## 2025-07-29 PROCEDURE — 76514 ECHO EXAM OF EYE THICKNESS: CPT | Performed by: OPHTHALMOLOGY

## 2025-07-29 PROCEDURE — 92083 EXTENDED VISUAL FIELD XM: CPT | Performed by: OPHTHALMOLOGY

## 2025-07-29 ASSESSMENT — KERATOMETRY
OS_K1POWER_DIOPTERS: 41.50
OD_AXISANGLE_DEGREES: 100
OD_K1POWER_DIOPTERS: 41.25
OS_AXISANGLE_DEGREES: 097
OD_K2POWER_DIOPTERS: 42.00
OS_K2POWER_DIOPTERS: 42.00

## 2025-07-29 ASSESSMENT — PACHYMETRY
OD_CT_UM: 566
OS_CT_CORRECTION: -2
OD_CT_CORRECTION: -1
OS_CT_UM: 577

## 2025-07-29 ASSESSMENT — TONOMETRY
OS_IOP_MMHG: 11
OD_IOP_MMHG: 12

## 2025-07-29 ASSESSMENT — REFRACTION_AUTOREFRACTION
OS_CYLINDER: 0.00
OD_CYLINDER: -0.75
OS_SPHERE: +1.75
OD_SPHERE: +1.75
OS_AXIS: 045
OD_AXIS: 092

## 2025-07-29 ASSESSMENT — VISUAL ACUITY
OS_BCVA: 20/50-1
OD_BCVA: 20/30-1

## 2025-07-29 ASSESSMENT — CONFRONTATIONAL VISUAL FIELD TEST (CVF)
OS_FINDINGS: FULL
OD_FINDINGS: FULL

## (undated) DEVICE — TISSUE RETRIEVAL SYSTEM: Brand: INZII RETRIEVAL SYSTEM

## (undated) DEVICE — GLOVE SRG BIOGEL 8

## (undated) DEVICE — SYRINGE CATH TIP 50ML

## (undated) DEVICE — ARM DRAPE

## (undated) DEVICE — TRANSDUCER ROBOTIC DROP IN

## (undated) DEVICE — SURGICEL 4 X 8

## (undated) DEVICE — UTILITY MARKER,BLACK WITH LABELS: Brand: DEVON

## (undated) DEVICE — DRAPE LAPAROTOMY W/POUCHES

## (undated) DEVICE — LAPAROSCOPIC DUAL RIGID APPLICATOR: Brand: ETHICON

## (undated) DEVICE — THE ECHELON, ECHELON ENDOPATH™ AND ECHELON FLEX™ FAMILIES OF ENDOSCOPIC LINEAR CUTTERS AND RELOADS ARE STERILE, SINGLE PATIENT USE INSTRUMENTS THAT SIMULTANEOUSLY CUT AND STAPLE TISSUE. THERE ARE SIX STAGGERED ROWS OF STAPLES, THREE ON EITHER SIDE OF THE CUT LINE. THE 45 MM INSTRUMENTS HAVE A STAPLE LINE THATIS APPROXIMATELY 45 MM LONG AND A CUT LINE THAT IS APPROXIMATELY 42 MM LONG. THE SHAFT CAN ROTATE FREELY IN BOTH DIRECTIONS AND AN ARTICULATION MECHANISM ON ARTICULATING INSTRUMENTS ENABLES BENDING THE DISTAL PORTIONOF THE SHAFT TO FACILITATE LATERAL ACCESS OF THE OPERATIVE SITE.THE INSTRUMENTS ARE SHIPPED WITHOUT A RELOAD AND MUST BE LOADED PRIOR TO USE. A STAPLE RETAINING CAP ON THE RELOAD PROTECTS THE STAPLE LEG POINTS DURING SHIPPING AND TRANSPORTATION. THE INSTRUMENTS’ LOCK-OUT FEATURE IS DESIGNED TO PREVENT A USED RELOAD FROM BEING REFIRED.: Brand: ECHELON ENDOPATH

## (undated) DEVICE — GLOVE INDICATOR PI UNDERGLOVE SZ 6.5 BLUE

## (undated) DEVICE — AIRSEAL TUBE SMOKE EVAC LUMENX3 FILTERED

## (undated) DEVICE — HEMOSTATIC MATRIX SURGIFLO 8ML W/THROMBIN

## (undated) DEVICE — COLUMN DRAPE

## (undated) DEVICE — GLOVE INDICATOR PI UNDERGLOVE SZ 8 BLUE

## (undated) DEVICE — SUT PDS II 1 CT-1 27 IN Z347H

## (undated) DEVICE — JACKSON-PRATT 100CC BULB RESERVOIR: Brand: CARDINAL HEALTH

## (undated) DEVICE — SUT MONOCRYL 4-0 PS-2 27 IN Y426H

## (undated) DEVICE — SUT VICRYL 2-0 SH 27 IN UNDYED J417H

## (undated) DEVICE — TIP COVER ACCESSORY

## (undated) DEVICE — 3000CC GUARDIAN II: Brand: GUARDIAN

## (undated) DEVICE — GLOVE SRG BIOGEL 7.5

## (undated) DEVICE — GLOVE SRG BIOGEL 6

## (undated) DEVICE — SUT ETHILON 3-0 FS-1 18 IN 663G

## (undated) DEVICE — 3M™ TEGADERM™ TRANSPARENT FILM DRESSING FRAME STYLE, 1628, 6 IN X 8 IN (15 CM X 20 CM), 10/CT 8CT/CASE: Brand: 3M™ TEGADERM™

## (undated) DEVICE — MARYLAND BIPOLAR FORCEPS: Brand: ENDOWRIST

## (undated) DEVICE — MONOPOLAR CURVED SCISSORS: Brand: ENDOWRIST

## (undated) DEVICE — SUT SILK 0 30 IN A306H

## (undated) DEVICE — IRRIG ENDO FLO TUBING

## (undated) DEVICE — INTENDED FOR TISSUE SEPARATION, AND OTHER PROCEDURES THAT REQUIRE A SHARP SURGICAL BLADE TO PUNCTURE OR CUT.: Brand: BARD-PARKER SAFETY BLADES SIZE 11, STERILE

## (undated) DEVICE — 3M™ IOBAN™ 2 ANTIMICROBIAL INCISE DRAPE 6650EZ: Brand: IOBAN™ 2

## (undated) DEVICE — THE ECHELON FLEX POWERED PLUS ARTICULATING ENDOSCOPIC LINEAR CUTTERS ARE STERILE, SINGLE PATIENT USE INSTRUMENTS THAT SIMULTANEOUSLYCUT AND STAPLE TISSUE. THERE ARE SIX STAGGERED ROWS OF STAPLES, THREE ON EITHER SIDE OF THE CUT LINE. THE ECHELON FLEX 45 POWERED PLUSINSTRUMENTS HAVE A STAPLE LINE THAT IS APPROXIMATELY 45 MM LONG AND A CUT LINE THAT IS APPROXIMATELY 42 MM LONG. THE SHAFT CAN ROTATE FREELYIN BOTH DIRECTIONS AND AN ARTICULATION MECHANISM ENABLES THE DISTAL PORTION OF THE SHAFT TO PIVOT TO FACILITATE LATERAL ACCESS TO THE OPERATIVESITE.THE INSTRUMENTS ARE PACKAGED WITH A PRIMARY LITHIUM BATTERY PACK THAT MUST BE INSTALLED PRIOR TO USE. THERE ARE SPECIFIC REQUIREMENTS FORDISPOSING OF THE BATTERY PACK. REFER TO THE BATTERY PACK DISPOSAL SECTION.THE INSTRUMENTS ARE PACKAGED WITHOUT A RELOAD AND MUST BE LOADED PRIOR TO USE. A STAPLE RETAINING CAP ON THE RELOAD PROTECTS THE STAPLE LEGPOINTS DURING SHIPPING AND TRANSPORTATION. THE INSTRUMENTS’ LOCK-OUT FEATURE IS DESIGNED TO PREVENT A USED OR IMPROPERLY INSTALLED RELOADFROM BEING REFIRED OR AN INSTRUMENT FROM BEING FIRED WITHOUT A RELOAD.: Brand: ECHELON FLEX

## (undated) DEVICE — ADHESIVE SKIN HIGH VISCOSITY EXOFIN 1ML

## (undated) DEVICE — LUBRICANT INST ELECTROLUBE ANTISTK WO PAD

## (undated) DEVICE — SUT VLOC 90 3-0 V-20 9IN VLOCM0644

## (undated) DEVICE — BETHLEHEM UNIVERSAL LAPAROTOMY: Brand: CARDINAL HEALTH

## (undated) DEVICE — ASTOUND STANDARD SURGICAL GOWN, XL: Brand: CONVERTORS

## (undated) DEVICE — TRAY FOLEY 16FR URIMETER SURESTEP

## (undated) DEVICE — TROCAR PORT ACCESS 12 X120MM W/BLDLS OPTICAL TIP AIRSEAL

## (undated) DEVICE — DRAPE SHEET X-LG

## (undated) DEVICE — JP CHANNEL DRAIN 19FR, FULL FLUTES: Brand: JACKSON-PRATT

## (undated) DEVICE — LARGE NEEDLE DRIVER: Brand: ENDOWRIST

## (undated) DEVICE — DRAPE EQUIPMENT RF WAND

## (undated) DEVICE — ENDOPATH PNEUMONEEDLE INSUFFLATION NEEDLES WITH LUER LOCK CONNECTORS 120MM: Brand: ENDOPATH

## (undated) DEVICE — CANNULA SEAL

## (undated) DEVICE — FENESTRATED BIPOLAR FORCEPS: Brand: ENDOWRIST

## (undated) DEVICE — VISUALIZATION SYSTEM: Brand: CLEARIFY

## (undated) DEVICE — HEM-O-LOK CLIP CARTRIDGE LARGE DA VINCI SI/XI